# Patient Record
Sex: MALE | Race: WHITE | Employment: FULL TIME | URBAN - METROPOLITAN AREA
[De-identification: names, ages, dates, MRNs, and addresses within clinical notes are randomized per-mention and may not be internally consistent; named-entity substitution may affect disease eponyms.]

---

## 2017-11-21 ENCOUNTER — OFFICE VISIT (OUTPATIENT)
Dept: DERMATOLOGY | Age: 47
End: 2017-11-21

## 2017-11-21 DIAGNOSIS — L91.8 CUTANEOUS SKIN TAGS: ICD-10-CM

## 2017-11-21 DIAGNOSIS — L81.2 FRECKLES: ICD-10-CM

## 2017-11-21 DIAGNOSIS — L98.9 SKIN LESION OF FACE: Primary | ICD-10-CM

## 2017-11-21 PROCEDURE — 99202 OFFICE O/P NEW SF 15 MIN: CPT | Performed by: DERMATOLOGY

## 2017-11-21 PROCEDURE — G8421 BMI NOT CALCULATED: HCPCS | Performed by: DERMATOLOGY

## 2017-11-21 PROCEDURE — G8427 DOCREV CUR MEDS BY ELIG CLIN: HCPCS | Performed by: DERMATOLOGY

## 2017-11-21 PROCEDURE — 1036F TOBACCO NON-USER: CPT | Performed by: DERMATOLOGY

## 2017-11-21 PROCEDURE — G8484 FLU IMMUNIZE NO ADMIN: HCPCS | Performed by: DERMATOLOGY

## 2017-11-21 NOTE — PROGRESS NOTES
central forehead - likely lipoma    Return for excision. Discussed risk of scar. 2. Cutaneous skin tags - not bothersome    Reassurance. 3. Freckles     Encouraged sun protective behaviors. Return for 30 minute procedure.

## 2018-11-30 ENCOUNTER — PROCEDURE VISIT (OUTPATIENT)
Dept: DERMATOLOGY | Age: 48
End: 2018-11-30
Payer: COMMERCIAL

## 2018-11-30 DIAGNOSIS — L72.3 INFLAMED EPIDERMOID CYST OF SKIN: Primary | ICD-10-CM

## 2018-11-30 PROCEDURE — 11441 EXC FACE-MM B9+MARG 0.6-1 CM: CPT | Performed by: DERMATOLOGY

## 2018-11-30 NOTE — PROGRESS NOTES
[facility]  Anastasia Dominique MD  [phone]      [facility]  1970    57 y.o. male     Date of Visit: 11/30/2018    Chief Complaint: skin lesion forehead    History of Present Illness:    Here today for an enlarging intermittently painful lesion on the forehead. Review of Systems:  Gen: Feels well, good sense of health. Heme: No abnormal bruising or bleeding. Past Medical History, Family History, Surgical History, Medications and Allergies reviewed. Past Medical History:   Diagnosis Date    Hyperlipidemia      Past Surgical History:   Procedure Laterality Date    BACK SURGERY  2015    spinal fusion       No Known Allergies  Outpatient Prescriptions Marked as Taking for the 11/30/18 encounter (Procedure visit) with Amanda Pereira MD   Medication Sig Dispense Refill    atorvastatin (LIPITOR) 10 MG tablet Take 10 mg by mouth nightly.  ALPRAZolam (XANAX) 1 MG tablet Take 1 mg by mouth 3 times daily as needed for Sleep.  Choline Fenofibrate (FENOFIBRIC ACID) 135 MG CPDR Take  by mouth.  omega-3 acid ethyl esters (LOVAZA) 1 G capsule Take 2 g by mouth 2 times daily.  vitamin D (CHOLECALCIFEROL) 1000 UNIT TABS tablet Take 3,000 Units by mouth daily       esomeprazole Magnesium (NEXIUM) 40 MG PACK Take 40 mg by mouth daily. Physical Examination       Well appearing. 1.  Central forehead - 1 cm round mildly erythematous nodule. Assessment and Plan     1. Inflamed epidermoid cyst of skin of the forehead    The site to be treated was confirmed with the patient and the previous note. The patient was educated regarding potential risks of bleeding, discomfort, scar, and recurrence. A consent form was signed by the patient. The area was prepped and draped in the normal sterile fashion. Local anesthesia was obtained wth 1% lidocaine with epinephrine.  An incision was performed overlying the cyst, the cyst was visualized

## 2020-09-24 ENCOUNTER — OFFICE VISIT (OUTPATIENT)
Dept: ORTHOPEDIC SURGERY | Age: 50
End: 2020-09-24
Payer: COMMERCIAL

## 2020-09-24 VITALS — TEMPERATURE: 97.4 F | WEIGHT: 244 LBS | BODY MASS INDEX: 32.34 KG/M2 | HEIGHT: 73 IN

## 2020-09-24 PROCEDURE — G8419 CALC BMI OUT NRM PARAM NOF/U: HCPCS | Performed by: ORTHOPAEDIC SURGERY

## 2020-09-24 PROCEDURE — G8427 DOCREV CUR MEDS BY ELIG CLIN: HCPCS | Performed by: ORTHOPAEDIC SURGERY

## 2020-09-24 PROCEDURE — 1036F TOBACCO NON-USER: CPT | Performed by: ORTHOPAEDIC SURGERY

## 2020-09-24 PROCEDURE — 99204 OFFICE O/P NEW MOD 45 MIN: CPT | Performed by: ORTHOPAEDIC SURGERY

## 2020-09-24 RX ORDER — MELOXICAM 15 MG/1
15 TABLET ORAL DAILY
Qty: 90 TABLET | Refills: 3 | Status: SHIPPED | OUTPATIENT
Start: 2020-09-24 | End: 2022-07-08

## 2020-09-24 NOTE — PROGRESS NOTES
9/24/20  History of Present Illness:  Enedina Shetty is a 54 y.o. male    Chief complaint today in the office: First-time evaluation for right knee pain he had seen another orthopedic group about a month ago did not feel comfortable and wanted to come get a second opinion  Location right  knee   Severity 7 out of 10  Duration about 6 weeks  Associated sign/symptoms pain, swelling, stiffness, trouble doing stairs, trouble getting up out of a chair gets much worse towards the end of the day    Medical History  Patient's medications, allergies, past medical, surgical, social and family histories were reviewed and updated as appropriate. I have reviewed and discussed the below Pain assessment findings with the patient. Pain Assessment  Location of Pain: Knee  Location Modifiers: Right, Anterior  Severity of Pain: 7  Quality of Pain: Sharp  Duration of Pain: Persistent  Frequency of Pain: Intermittent  Aggravating Factors: Walking, Stairs, Exercise  Limiting Behavior: Some  Relieving Factors: Rest, Nsaids  Result of Injury: No  Work-Related Injury: No  Are there other pain locations you wish to document?: No    Review of Systems  No new rashes  No numbness  No tingling  No fever  No depression  No new pain patterns  Pertinent items are noted in HPI  Review of systems reviewed from Patient History Form dated on 9/24/2020 and available in the patient's chart under the Media tab. No change in the patient's medical history form. Examination:  General Exam:    Vitals: Height 6' 1\" (1.854 m), weight 244 lb (110.7 kg). BMI Readings from Last 3 Encounters:   09/24/20 32.19 kg/m²     Constitutional: Patient is adequately groomed with no evidence of malnutrition  Mental Status: The patient is alert and oriented to time, place and person. The patient's mood and affect are appropriate. Lymphatic: The lymphatic examination bilaterally reveals all areas to be without enlargement or induration.   Vascular: Examination reveals no swelling or calf tenderness. Peripheral pulses are palpable and 2+. Neurological: The patient has good coordination. There is no weakness or sensory deficit. Skin:    Head/Neck: inspection reveals no rashes, ulcerations or lesions. Trunk:  inspection reveals no rashes, ulcerations or lesions. Right Lower Extremity: inspection reveals no rashes, ulcerations or lesions. Left Lower Extremity: inspection reveals no rashes, ulcerations or lesions. PHYSICAL EXAM:      Knee Examination  Inspection:  No abrasions no lacerations no signs of infection or obvious deformity moderate to severe obvious swelling and joint effusion. Palpation:   Palpation reveals moderate pain medial joint line,   No lateral joint line pain, moderate joint effusion. Range of Motion: 0-150 degrees flexion/ extension   Hip extension to 20 hip flexion to 70+  Lumbar ROM -20 extension flexion to 6 inches from the floor. Strength: Quadriceps testing 5/5, hamstring muscle testing 5/5, EHL against resistance is 5/5, hip flexor strength is intact 5/5, internal and external rotation of the hip against resistance is also intact 5/5. Special Tests: stable Lachman, negative anterior drawer, negative pivot shift, no posterior sag no posterior drawer does not open to valgus or varus stress at 0 or 30°, Steinmann's positive painful medial, Eric's positive painful medial, Homans negative Harry negative, pedal pulses are +2/4 capillary refill is brisk sensation is intact ankle exam and hip exam are shows no pain with full range of motion provocative testing of the hip is nonpainful muscle testing around the hip is 5 over 5. Large joint effusion lumbar flexion to 6 inches from floor without pain. Gait: antalgic     Reflex: Intact  Lower extremity Deep tendon reflexes +2/4 and equal bilaterally for patella and Achilles.   Upper extremity reflexes:  of the biceps, triceps, brachioradialis Placed This Encounter   Procedures    XR KNEE RIGHT (MIN 4 VIEWS)     Standing Status:   Future     Number of Occurrences:   1     Standing Expiration Date:   2021       Differential Diagnoses: Medial meniscus tear with large joint effusion, infection, contusion, knee pathology, Muscle injury, bone tumor or stress fracture. Possible other diagnoses: Same as differential diagnosis      Plan (Medical Decision Making):    I discussed the diagnosis and the treatment options with Singh Bergman today. In Summary:  The various treatment options were outlined and discussed with Singh Bergman including:  Conservative care options: physical therapy, ice, medications, bracing, and activity modification. The indications for therapeutic injections. The indications for additional imaging/laboratory studies. The indications for (possible future) interventions. After considering the various options discussed, Signh Bergman elected to pursue a course of treatment that includes the followin. Medications: No further recommendations for new medications. 2. PT:  Prescribed home exercise program.    3. Further studies: Setup for Knee MRI without contrast to evaluate for soft tissue pathology, loose body, ligament and tendon tears      4. Interventional:  \"After further imaging is obtained, interventional options will be reviewed and recommended. Radiologic imaging and symptoms confirm the pain etiology. Risks, benefits and alternatives of interventional options were discussed. These include and are not limited to bleeding, infection, spinal headache, nerve injury, increased pain and lack of pain relief. The patient verbalized understanding and would like to proceed. The patient will be scheduled accordingly    5.  Healthy Lifestyle Measures:  Patient education material reviewing the following was distributed to Singh Bergman  Anatomic drawings  Healthy lifestyle education  Advanced imaging preparedness

## 2020-10-01 ENCOUNTER — OFFICE VISIT (OUTPATIENT)
Dept: ORTHOPEDIC SURGERY | Age: 50
End: 2020-10-01
Payer: COMMERCIAL

## 2020-10-01 VITALS — HEIGHT: 73 IN | BODY MASS INDEX: 32.34 KG/M2 | WEIGHT: 244 LBS | TEMPERATURE: 97.4 F

## 2020-10-01 PROBLEM — M65.9 SYNOVITIS OF KNEE: Status: ACTIVE | Noted: 2020-10-01

## 2020-10-01 PROBLEM — M25.561 ACUTE PAIN OF RIGHT KNEE: Status: ACTIVE | Noted: 2020-10-01

## 2020-10-01 PROBLEM — M71.21 SYNOVIAL CYST OF RIGHT POPLITEAL SPACE: Status: ACTIVE | Noted: 2020-10-01

## 2020-10-01 PROBLEM — S83.231A COMPLEX TEAR OF MEDIAL MENISCUS OF RIGHT KNEE AS CURRENT INJURY: Status: ACTIVE | Noted: 2020-10-01

## 2020-10-01 PROCEDURE — G8484 FLU IMMUNIZE NO ADMIN: HCPCS | Performed by: ORTHOPAEDIC SURGERY

## 2020-10-01 PROCEDURE — 99214 OFFICE O/P EST MOD 30 MIN: CPT | Performed by: ORTHOPAEDIC SURGERY

## 2020-10-01 PROCEDURE — 1036F TOBACCO NON-USER: CPT | Performed by: ORTHOPAEDIC SURGERY

## 2020-10-01 PROCEDURE — G8427 DOCREV CUR MEDS BY ELIG CLIN: HCPCS | Performed by: ORTHOPAEDIC SURGERY

## 2020-10-01 PROCEDURE — G8417 CALC BMI ABV UP PARAM F/U: HCPCS | Performed by: ORTHOPAEDIC SURGERY

## 2020-10-01 NOTE — PROGRESS NOTES
10/1/20  History of Present Illness:  Dixie Jerry is a 54 y.o. male    Chief complaint today in the office: Right knee recheck evaluation    Location right knee   Severity moderate to severe  Duration several weeks now  Associated sign/symptoms pain, swelling, loss of motion some catching some locking some giving way    Medical History  Patient's medications, allergies, past medical, surgical, social and family histories were reviewed and updated as appropriate. Review of Systems  No new rashes  No numbness  No tingling  No fever  No depression  No new pain patterns  Pertinent items are noted in HPI  Review of systems reviewed from Patient History Form dated on 9/24/2020 and available in the patient's chart under the Media tab. No change in the patient's medical history form. Examination:  General Exam:    Vitals: Temperature 97.4 °F (36.3 °C), height 6' 1\" (1.854 m), weight 244 lb (110.7 kg). BMI Readings from Last 3 Encounters:   10/01/20 32.19 kg/m²   09/24/20 32.19 kg/m²     Constitutional: Patient is adequately groomed with no evidence of malnutrition  Mental Status: The patient is alert and oriented to time, place and person. The patient's mood and affect are appropriate. Lymphatic: The lymphatic examination bilaterally reveals all areas to be without enlargement or induration. Vascular: Examination reveals no swelling or calf tenderness. Peripheral pulses are palpable and 2+. Neurological: The patient has good coordination. There is no weakness or sensory deficit. Skin:    Head/Neck: inspection reveals no rashes, ulcerations or lesions. Trunk:  inspection reveals no rashes, ulcerations or lesions. Right Lower Extremity: inspection reveals no rashes, ulcerations or lesions. Left Lower Extremity: inspection reveals no rashes, ulcerations or lesions.                                       PHYSICAL EXAM:      Knee Examination  Inspection:  No abrasions no lacerations no signs of infection or obvious deformity moderate obvious swelling and joint effusion. Palpation:   Palpation reveals moderate pain medial joint line,   Moderate lateral joint line pain, moderate joint effusion. Range of Motion: 0-140 degrees flexion/ extension   Hip extension to 20 hip flexion to 70+  Lumbar ROM -20 extension flexion to 6 inches from the floor. Strength: Quadriceps testing 4/5, hamstring muscle testing 4/5, EHL against resistance is 5/5, hip flexor strength is intact 5/5, internal and external rotation of the hip against resistance is also intact 5/5. Special Tests: stable Lachman, negative anterior drawer, negative pivot shift, no posterior sag no posterior drawer does not open to valgus or varus stress at 0 or 30°, Steinmann's positive, Eric's positive, Homans negative Harry negative, pedal pulses are +2/4 capillary refill is brisk sensation is intact ankle exam and hip exam are shows no pain with full range of motion provocative testing of the hip is nonpainful muscle testing around the hip is 5 over 5. Moderate sized Baker's cyst posteriorly with pain  Lumbar flexion to 6 inches from floor without pain. Gait: antalgic     Reflex: Intact  Lower extremity Deep tendon reflexes +2/4 and equal bilaterally for patella and Achilles. Upper extremity reflexes:  of the biceps, triceps, brachioradialis +2/4 equal bilaterally. Contralateral  Knee: Negative Lachman negative anterior drawer negative pivot shift no posterior sag no posterior drawer does not open to valgus or varus stress at 0 or 30° negative Steinmann's negative Eric's negative Homans negative Harry pedal pulses are +2/4 capillary refill is brisk sensation is intact ankle exam and hip exam are shows no pain with full range of motion provocative testing of the hip is nonpainful muscle testing around the hip is 5 over 5.       Hip and lumbar testing does not reproduce pain evocative testing does not reproduce symptomatology. Additional Examinations:  Right Upper Extremity:  Examination of the right upper extremity does not show any tenderness, deformity or injury. Range of motion is unremarkable. There is no gross instability. There are no rashes, ulcerations or lesions. Strength and tone are normal.  Left Upper Extremity: Examination of the left upper extremity does not show any tenderness, deformity or injury. Range of motion is unremarkable. There is no gross instability. There are no rashes, ulcerations or lesions. Strength and tone are normal.  Lower Back: Examination of the lower back does not show any tenderness, deformity or injury. Range of motion is unremarkable. There is no gross instability. There are no rashes, ulcerations or lesions. Strength and tone are normal.    Past Surgical History:   Procedure Laterality Date    BACK SURGERY      spinal fusion   . Radiology:     X-rays reviewed in office:  I independently reviewed the films in the office today. Views MRI multiple views  Body Part right knee  Impression medial meniscus tear, synovitis, MCL injury, Baker's cyst with dehiscence, IT band syndrome,    Xr Knee Right (min 4 Views)    Result Date: 2020  Radiology exam is complete. No Radiologist dictation. Please follow up with ordering provider. Mri Lower Extremity W Jt Wo Contrast    Result Date: 2020  Site: [facility] #: [id number] #: [id number] Procedure: MR Right Knee w/o Contrast ; Reason for Exam: DX: Acute pain in Right knee; MMT This document is confidential medical information. Unauthorized disclosure or use of this information is prohibited by law. If you are not the intended recipient of this document, please advise us by calling immediately [phone].  Orlin Whaley, [address] [place] [address] Patient Name: Galen Elizabeth Case ID: [id number] Patient : 1970 Referring Physician: Monae Chang DO Exam Date: 2020 Exam Description: MR Right Knee placed in this encounter. Differential Diagnoses: Medial meniscus tear, MCL injury, Baker's cyst, IT band syndrome, synovitis, infection, contusion, knee pathology, Muscle injury, bone tumor or stress fracture. Possible other diagnoses: Same as differential diagnosis      Plan (Medical Decision Making):    I discussed the diagnosis and the treatment options with Merissaelizabeth Greenwood today. In Summary:  The various treatment options were outlined and discussed with Merissaelizabeth Greenwood including:  Conservative care options: physical therapy, ice, medications, bracing, and activity modification. The indications for therapeutic injections. The indications for additional imaging/laboratory studies. The indications for (possible future) interventions. After considering the various options discussed, Merissa Greenwood elected to pursue a course of treatment that includes the followin. Medications: Injection today so we can go on vacation in [place]    2. PT:  Prescribed home exercise program.    3. Further studies: No further studies. 4. Interventional:  We discussed pursuing I spent 15+ minutes, face to face, with the patient discussing and answering questions regarding the risks, benefits, and complications of arthroscopic knee surgery. The patient realizes that there are concerns with this surgery with respect to infection, deep vein thrombosis, neurological injury, delayed  rehabilitation, the possibility of arthrofibrosis of the knee, and specifically  Hoffa's fat pad fibrosis that can potentially cause difficulties. The patient realizes that there are also anesthetic concerns including cardiopulmonary issues, pulmonary issues, and even possibility of death or dystrophy. The patient voiced understanding to this as well as the normal  rehabilitation  that   is involved with weeks of physical therapy, exercise, and strengthening.  The patient also realizes that even though the surgery, from a functional perspective, typically allows the patient to return to good function at about 6 weeks, that it often takes 6 months to completely rehabilitate from this operation. The patient also realizes that if there is an arthritic component to the symptoms, then they may still have some degree of arthritis pain. .  Radiologic imaging and symptoms confirm the pain etiology. Risks, benefits and alternatives of interventional options were discussed. These include and are not limited to bleeding, infection, spinal headache, nerve injury, increased pain and lack of pain relief. The patient verbalized understanding and would like to proceed. The patient will be scheduled accordingly    5. Healthy Lifestyle Measures:  Patient education material reviewing the following was distributed to Chinedu Sanford  Anatomic drawings  Healthy lifestyle education  Advanced imaging preparedness    Proper lifting and carrying techniques,   Weight management discussed  Quitting smoking      6. Follow up:  after surgery      Chinedu Sanford was instructed to call the office if his symptoms worsen or if new symptoms appear prior to the next scheduled visit. He is specifically instructed to contact the office between now & his scheduled appointment if he has concerns related to his condition or if he needs assistance in scheduling the above tests. He is   welcome to call for an appointment sooner if he has any additional concerns or questions. Albertina Pereira DO    [facility]  Sports Fellowship Trained  Board Certified  [employer] Team Physician      Disclaimer: \"This note was dictated with voice recognition software. Though review and correction are routine, we apologize for any errors. \"

## 2020-10-15 ENCOUNTER — TELEPHONE (OUTPATIENT)
Dept: ORTHOPEDIC SURGERY | Age: 50
End: 2020-10-15

## 2020-10-26 LAB — SARS-COV-2, PCR: NOT DETECTED

## 2021-03-08 ENCOUNTER — TELEPHONE (OUTPATIENT)
Dept: ORTHOPEDIC SURGERY | Age: 51
End: 2021-03-08

## 2021-03-08 NOTE — TELEPHONE ENCOUNTER
E-MAILED [facility] ([address]) FOR 9/24/2020 TO 11/30/2020 TO THE PATIENT TO THE ADDRESS ON RELEASE FORM.

## 2021-05-03 ENCOUNTER — HOSPITAL ENCOUNTER (OUTPATIENT)
Dept: PHYSICAL THERAPY | Age: 51
Setting detail: THERAPIES SERIES
Discharge: HOME | End: 2021-05-03
Payer: COMMERCIAL

## 2021-05-03 PROCEDURE — 97161 PT EVAL LOW COMPLEX 20 MIN: CPT

## 2021-05-03 PROCEDURE — 97530 THERAPEUTIC ACTIVITIES: CPT

## 2021-05-03 PROCEDURE — 97110 THERAPEUTIC EXERCISES: CPT

## 2021-05-03 NOTE — PLAN OF CARE
The [address] [place] [address] and Sports Rehabilitation, Jackelyn Alaniz  [phone]                                                       Physical Therapy Certification    Dear Referring Practitioner: Dr. Denise Yuan,    We had the pleasure of evaluating the following patient for physical therapy services at [address] [place] [address]. A summary of our findings can be found in the initial assessment below. This includes our plan of care. If you have any questions or concerns regarding these findings, please do not hesitate to contact me at the office phone number checked above.   Thank you for the referral.       Physician Signature:_______________________________Date:__________________  By signing above (or electronic signature), therapists plan is approved by physician      Patient: Brandy Lim   : 1970   MRN: [id number]  Referring Physician: Referring Practitioner: Dr. Denise Yuan      Evaluation Date: 5/3/2021      Medical Diagnosis Information:  Diagnosis: M17.11 Right Knee Osteoarthritis   Treatment Diagnosis: M25.561 R Knee Pain; R53.1 Weakness; M62.559 Atrophy of unspecified thigh                                         Insurance information: PT Insurance Information: PT BENEFITS  UHC/ EFFECTIVE 2020/ ACTIVE/ DED 0/ COPAY 20/ PAYS 100%/ 20 VPCY HARD MAX/ 0 AUTH/ ALL CODES BILLABLE/ TELEHEALTH NOT COVERED/ [facility] REF# [id number]/ 2021 PAG     Precautions/ Contra-indications: Seasonal allergies; mild asthma; HLD; OA right knee    C-SSRS Triggered by Intake questionnaire (Past 2 wk assessment):   [x] No, Questionnaire did not trigger screening.   [] Yes, Patient intake triggered further evaluation      [] C-SSRS Screening completed  [] PCP notified via Plan of Care  [] Emergency services notified     Latex Allergy:  [x]NO      []YES  Preferred Language for Healthcare:   [x]English       []other:    SUBJECTIVE: Patient stated complaint: 52year old male presents to PT with right knee pain. Had surgery on 10/27/20 for medial mensicus, bakers cyst and partial MCL sprain as well as dealt with IT band syndrome/capsulitis. He got COVID immediately after surgery so had to do most of his physical therapy at home via HEP. 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 X-rays now showing \"Bone on bone\" arthritis in medial compartment and was told he needed either a partial or a full knee replacement. Pain is located slightly along MJL but more so anteriorly across PF joint. Can build in intensity and become sharp. Also reports painful clicking, popping, and catching at times.      Relevant Medical History: Right Knee Surgery October 2020 by Dr. 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 walk constantly on concrete floor in steel-toe shoes ([employer])    Living Status/Prior Level of Function: Independent with ADLs and IADLs; walking for exercise, used to go to gym; enjoys golfing    OBJECTIVE:      5/3/21  Flexibility L R Comment   Hamstring      Gastroc      ITB      Quad              5/3/21  ROM PROM AROM Overpressure Comment    L R L R L R    Flexion  130 130 95      Extension   +5 0                            5/3/21  Strength L R Comment   Quad 5 3+ QS   Hamstring 4+ 4    Gastroc      Hip  flexion      Hip abd 4+ 4-                  5/3/21  Special Test Results/Comment   Meniscal Click Neg   Crepitus Pos on both PF joints   Flexion Test Mild MJL pain at end range   Valgus Laxity    Varus Laxity    Lachmans    Drop Back    Homans Neg         5/3/21  Girth L R   Mid Patella 43.5 43.5   Suprapatellar 45.5 46.0   5cm above 49.0 48.5   15cm above       Reflexes/Sensation:    [x]Dermatomes/Myotomes intact    [x]Reflexes equal and normal bilaterally   []Other:    Joint mobility:     []Normal    [x]Hypo: decrease PF mobility mild on right   []Hyper    Palpation: TTP along MJL, pes anserine, and lateral infrapatellar fat pads    Functional Mobility/Transfers: ind    Posture: varus    Bandages/Dressings/Incisions: healed scope incisions    Gait: (include devices/WB status) antalgic on right    Orthopedic Special Tests: see above. [x] Patient history, allergies, meds reviewed. Medical chart reviewed. See intake form. Review Of Systems (ROS):  [x]Performed Review of systems (Integumentary, CardioPulmonary, Neurological) by intake and observation. Intake form has been scanned into medical record. Patient has been instructed to contact their primary care physician regarding ROS issues if not already being addressed at this time.       Co-morbidities/Complexities (which will affect course of rehabilitation):   []None           Arthritic conditions   []Rheumatoid arthritis (M05.9)  [x]Osteoarthritis (M19.91)   Cardiovascular conditions   []Hypertension (I10)  []Hyperlipidemia (E78.5)  []Angina pectoris (I20)  []Atherosclerosis (I70)   Musculoskeletal conditions   []Disc pathology   []Congenital spine pathologies   [x]Prior surgical intervention  []Osteoporosis (M81.8)  []Osteopenia (M85.8)   Endocrine conditions   []Hypothyroid (E03.9)  []Hyperthyroid Gastrointestinal conditions   []Constipation (U92.59)   Metabolic conditions   [x]Morbid obesity (E66.01)  []Diabetes type 1(E10.65) or 2 (E11.65)   []Neuropathy (G60.9)     Pulmonary conditions   []Asthma (J45)  []Coughing   []COPD (J44.9)   Psychological jump   []other:     Participation Restrictions   [x]Reduced participation in self care activities   [x]Reduced participation in home management activities   [x]Reduced participation in work activities   [x]Reduced participation in social activities. [x]Reduced participation in sport activities. Classification :    [x]Signs/symptoms consistent with post-surgical status including decreased ROM, strength and function.    []Signs/symptoms consistent with joint sprain/strain   []Signs/symptoms consistent with patella-femoral syndrome   [x]Signs/symptoms consistent with knee OA/hip OA   [x]Signs/symptoms consistent with internal derangement of knee/Hip   [x]Signs/symptoms consistent with functional hip weakness/NMR control      []Signs/symptoms consistent with tendinitis/tendinosis    []signs/symptoms consistent with pathology which may benefit from Dry needling      []other:      Prognosis/Rehab Potential:      []Excellent   []Good    [x]Fair   []Poor    Tolerance of evaluation/treatment:    []Excellent   []Good    [x]Fair   []Poor    Physical Therapy Evaluation Complexity Justification  [x] A history of present problem with:  [] no personal factors and/or comorbidities that impact the plan of care;  [x]1-2 personal factors and/or comorbidities that impact the plan of care  []3 personal factors and/or comorbidities that impact the plan of care  [x] An examination of body systems using standardized tests and measures addressing any of the following: body structures and functions (impairments), activity limitations, and/or participation restrictions;:  [] a total of 1-2 or more elements   [] a total of 3 or more elements   [x] a total of 4 or more elements   [x] A clinical presentation with:  [x] stable and/or uncomplicated characteristics   [] evolving clinical presentation with changing characteristics  [] unstable and unpredictable characteristics;   [x] Clinical decision making of [x] low, [] moderate, [] high complexity using standardized patient assessment instrument and/or measurable assessment of functional outcome. [x] EVAL (LOW) 58769 (typically 20 minutes face-to-face)  [] EVAL (MOD) 14561 (typically 30 minutes face-to-face)  [] EVAL (HIGH) 64091 (typically 45 minutes face-to-face)  [] RE-EVAL       PLAN  Frequency/Duration:  2 days per week for 12 Weeks:  Interventions:  [x]  Therapeutic exercise including: strength training, ROM, for Lower extremity and core   [x]  NMR activation and proprioception for LE, Glutes and Core   [x]  Manual therapy as indicated for LE, Hip and spine to include: Dry Needling/IASTM, STM, PROM, Gr I-IV mobilizations, manipulation. [x] Modalities as needed that may include: thermal agents, E-stim, Biofeedback, US, iontophoresis as indicated  [x] Patient education on joint protection, postural re-education, activity modification, progression of HEP. HEP instruction:   Patient instructed on HEP on this date with handout provided and all questions answered. Discussions about how to progress sets/reps/resistance as necessary for fatigue and challenge. Patient was instructed to contact PT with any questions or concerns about HEP moving forward. Patient verbally stated she/he understood. Access Code: [email or web address]  URL: [email or web address]  Date: 05/03/2021  Prepared by:  Oli Kaiser    Exercises  Seated Table Hamstring Stretch - 2 x daily - 7 x weekly - 5 reps - 30\" hold  Long Sitting Calf Stretch with Strap - 2 x daily - 7 x weekly - 5 reps - 30\" hold  Long Sitting Quad Set - 1 x daily - 7 x weekly - 10 sets - 10\" hold  Supine Hip Adduction Isometric with Ball - 1 x daily - 7 x weekly - 10 reps - 10\" hold  Supine Active Straight Leg Raise - 1 x daily - 7 x weekly - 2 sets - 5 reps  Hooklying Clamshell with Resistance - 1 x daily - 7 x weekly - 2 sets - 10 reps  Clamshell - 1 x daily - 7 x weekly - 2 sets - 10 reps      GOALS:   Patient stated goal: Reduce pain in knee    [] Progressing: [] Met: [] Not Met: [] Adjusted    Therapist goals for Patient:   Short Term Goals: To be achieved in: 2 weeks  1. Independent in HEP and progression per patient tolerance, in order to prevent re-injury. [] Progressing: [] Met: [] Not Met: [] Adjusted   2. Patient will have a decrease in pain to facilitate improvement in movement, function, and ADLs as indicated by Functional Deficits. [] Progressing: [] Met: [] Not Met: [] Adjusted    Long Term Goals: To be achieved in: 12 weeks  1. Disability index score of 38% or less for the LEFS to assist with reaching prior level of function. [] Progressing: [] Met: [] Not Met: [] Adjusted  2. Patient will demonstrate increased AROM to 0-125 without pain to allow for proper joint functioning as indicated by patients Functional Deficits. [] Progressing: [] Met: [] Not Met: [] Adjusted  3. Patient will demonstrate an increase in Strength to g5/5 in BLE  to allow for proper functional mobility as indicated by patients Functional Deficits. [] Progressing: [] Met: [] Not Met: [] Adjusted  4. Patient will return to ADL and other functional activities without increased symptoms or restriction. [] Progressing: [] Met: [] Not Met: [] Adjusted  5. Patient will tolerate walking >30 minutes with <2/10 pain in knee (patient specific functional goal)    [] Progressing: [] Met: [] Not Met: [] Adjusted       Electronically signed by:  Ariane Antoine, PT, DPT, OCS    Note: If patient does not return for scheduled/ recommended follow up visits, this note will serve as a discharge from care along with most recent update on progress.

## 2021-05-03 NOTE — FLOWSHEET NOTE
[facility] [address] [place] [address] [facility]    [facility] Therapy Daily Treatment Note  Date:  5/3/2021    Patient Name:  Clair Leonardo    :  1970  MRN: [id number]  Restrictions/Precautions:    Medical/Treatment Diagnosis Information:  · Diagnosis: M17.11 Right Knee Osteoarthritis  · Treatment Diagnosis: M25.561 R Knee Pain; R53.1 Weakness; M62.559 Atrophy of unspecified thigh  Insurance/Certification information:  PT Insurance Information: PT BENEFITS  UHC/ EFFECTIVE 2020/ ACTIVE/ DED 0/ COPAY 20/ PAYS 100%/ 20 VPCY HARD MAX/ 0 AUTH/ ALL CODES BILLABLE/ TELEHEALTH NOT COVERED/ [facility] REF# [id number]/ 2021 PAG  Physician Information:  Referring Practitioner: Dr. Christianne Jones  Has the plan of care been signed (Y/N):        []  Yes  [x]  No     Date of Patient follow up with Physician: not scheduled      Is this a Progress Report:     []  Yes  [x]  No        If Yes:  Date Range for reporting period:  Beginnin/3/21  Ending:    Progress report will be due (10 Rx or 30 days whichever is less): 58       Recertification will be due (POC Duration  / 90 days whichever is less): 8/3/21         Visit # Insurance Allowable Auth Required   1 20 VPCY []  Yes []  No        OUTCOME MEASURE DATE DEFICIT   LEFS 5/3/21 IE 77% Deficit                 Latex Allergy:  [x]NO      []YES  Preferred Language for Healthcare:   [x]English       []other:    Pain level:  1-8/10     SUBJECTIVE:  See eval    OBJECTIVE:       5/3/21  Flexibility L R Comment   Hamstring -45 -45 90/90   Gastroc  Mod Mod     ITB Mod Mod Obers   Quad mild mild Elys                5/3/21  ROM PROM AROM Overpressure Comment     L R L R L R     Flexion   130 130 95         Extension     +5 0                                                5/3/21  Strength L R Comment   Quad 5 3+ QS   Hamstring 4+ 4     Gastroc         Hip  flexion         Hip abd 4+ 4-                            5/3/21  Special Test Results/Comment   Meniscal Click Neg   Crepitus Pos on both PF joints   Flexion Test Mild MJL pain at end range   Valgus Laxity     Varus Laxity     Lachmans     Drop Back     Homans Neg            5/3/21  Girth L R   Mid Patella 43.5 43.5   Suprapatellar 45.5 46.0   5cm above 49.0 48.5   15cm above          Reflexes/Sensation:               [x]? Dermatomes/Myotomes intact               [x]? Reflexes equal and normal bilaterally              []?Other:     Joint mobility:                []?Normal                       [x]? Hypo: decrease PF mobility mild on right              []?Hyper     Palpation: TTP along MJL, pes anserine, and lateral infrapatellar fat pads     Functional Mobility/Transfers: ind     Posture: varus     Bandages/Dressings/Incisions: healed scope incisions     Gait: (include devices/WB status) antalgic on right     Orthopedic Special Tests: see above.       RESTRICTIONS/PRECAUTIONS: Right Knee Surgery October 2020 by Dr. Manda Jj;   Exercises/Interventions:  Exercise/Equipment Resistance/Repetitions Other comments   Stretching       Hamstring 5x30\" Prop   Hip Flexor     ITB     Figure 4     Quad     Inclined Calf     Towel Pull 5x30\" Prop           ROM       EOB Self-Assist     Sheet Pull     Wall Slide     Manual     Biodex Passive     Recumbent Bike     ERMI     Hang Weights     Ankle Pumps             Patellar Glides       Medial       Superior       Inferior               Isometrics       Quad sets 10x10\" Towel roll under right knee   Add sets 10x10\"             SLR       Supine 2x5    Prone     Abduction     Adducton     SLR+               Glutes       Bridges     Supine Clams 3x10; Blue    S/L Clams 2x10 each No res   Side Stepping       Monster walks               CKC       Weight Shift     Calf raises     Wall sits     Step ups       Squatting     CC TKE     SL DL               PRE       Extension  RANGE: 90-30   Flexion  RANGE: Avail   Leg Press   RANGE: 80-10   Cable Column               Balance about HEP moving forward. Patient verbally stated she/he understood. Access Code: [email or web address]  URL: [email or web address]  Date: 05/03/2021  Prepared by: Oli Kaiser    Exercises  Seated Table Hamstring Stretch - 2 x daily - 7 x weekly - 5 reps - 30\" hold  Long Sitting Calf Stretch with Strap - 2 x daily - 7 x weekly - 5 reps - 30\" hold  Long Sitting Quad Set - 1 x daily - 7 x weekly - 10 sets - 10\" hold  Supine Hip Adduction Isometric with Ball - 1 x daily - 7 x weekly - 10 reps - 10\" hold  Supine Active Straight Leg Raise - 1 x daily - 7 x weekly - 2 sets - 5 reps  Hooklying Clamshell with Resistance - 1 x daily - 7 x weekly - 3 sets - 10 reps  Clamshell - 1 x daily - 7 x weekly - 2 sets - 10 reps      Therapeutic Exercise and NMR EXR  [x] (27792) Provided verbal/tactile cueing for activities related to strengthening, flexibility, endurance, ROM for improvements in LE, proximal hip, and core control with self care, mobility, lifting, ambulation. [x] (41671) Provided verbal/tactile cueing for activities related to improving balance, coordination, kinesthetic sense, posture, motor skill, proprioception  to assist with LE, proximal hip, and core control in self care, mobility, lifting, ambulation and eccentric single leg control.      NMR and Therapeutic Activities:    [x] (29598 or 76083) Provided verbal/tactile cueing for activities related to improving balance, coordination, kinesthetic sense, posture, motor skill, proprioception and motor activation to allow for proper function of core, proximal hip and LE with self care and ADLs  [] (41475) Gait Re-education- Provided training and instruction to the patient for proper LE, core and proximal hip recruitment and positioning and eccentric body weight control with ambulation re-education including up and down stairs     Home Exercise Program:    [x] (38286) Reviewed/Progressed HEP activities related to strengthening, flexibility, endurance, ROM of core, level of function. [] Progressing: [] Met: [] Not Met: [] Adjusted  2. Patient will demonstrate increased AROM to 0-125 without pain to allow for proper joint functioning as indicated by patients Functional Deficits. [] Progressing: [] Met: [] Not Met: [] Adjusted  3. Patient will demonstrate an increase in Strength to g5/5 in BLE  to allow for proper functional mobility as indicated by patients Functional Deficits. [] Progressing: [] Met: [] Not Met: [] Adjusted  4. Patient will return to ADL and other functional activities without increased symptoms or restriction. [] Progressing: [] Met: [] Not Met: [] Adjusted  5. Patient will tolerate walking >30 minutes with <2/10 pain in knee (patient specific functional goal)    [] Progressing: [] Met: [] Not Met: [] Adjusted    Overall Progression Towards Functional goals/ Treatment Progress Update:  [] Patient is progressing as expected towards functional goals listed. [] Progression is slowed due to complexities/Impairments listed. [] Progression has been slowed due to co-morbidities.   [x] Plan just implemented, too soon to assess goals progression <30days   [] Goals require adjustment due to lack of progress  [] Patient is not progressing as expected and requires additional follow up with physician  [] Other    Prognosis for POC: [x] Good [] Fair  [] Poor    Patient requires continued skilled intervention: [x] Yes  [] No    Treatment/Activity Tolerance:  [x] Patient able to complete treatment  [] Patient limited by fatigue  [] Patient limited by pain     [] Patient limited by other medical complications  [] Other:       PLAN: See eval  [] Continue per plan of care [] Alter current plan (see comments above)  [x] Plan of care initiated [] Hold pending MD visit [] Discharge    Electronically signed by:  Ronnie Dang, PT, DPT, OCS    Note: If patient does not return for scheduled/ recommended follow up visits, this note will serve as a discharge from care along with most recent update on progress.

## 2021-05-10 ENCOUNTER — HOSPITAL ENCOUNTER (OUTPATIENT)
Dept: PHYSICAL THERAPY | Age: 51
Setting detail: THERAPIES SERIES
Discharge: HOME | End: 2021-05-10
Payer: COMMERCIAL

## 2021-05-10 NOTE — FLOWSHEET NOTE
The [address]    Physical Therapy  Cancellation/No-show Note  Patient Name:  Carmine Gonzalez  :  1970   Date:  5/10/2021  Cancelled visits to date: 1  No-shows to date: 0    For today's appointment patient:  [x]  Cancelled  [x]  Rescheduled appointment  []  No-show     Reason given by patient:  []  Patient ill  []  Conflicting appointment   []  No transportation    [x]  Conflict with work  []  No reason given  []  Other:     Comments:      Electronically signed by:  Romeo Calvillo, PT, DPT, OCS

## 2021-05-11 ENCOUNTER — HOSPITAL ENCOUNTER (OUTPATIENT)
Dept: PHYSICAL THERAPY | Age: 51
Setting detail: THERAPIES SERIES
Discharge: HOME | End: 2021-05-11
Payer: COMMERCIAL

## 2021-05-11 PROCEDURE — 97110 THERAPEUTIC EXERCISES: CPT

## 2021-05-11 PROCEDURE — 97112 NEUROMUSCULAR REEDUCATION: CPT

## 2021-05-11 NOTE — FLOWSHEET NOTE
[address] [facility], [facility]    Physical Therapy Daily Treatment Note  Date:  2021    Patient Name:  Carlyle Rowley    :  1970  MRN: [id number]  Restrictions/Precautions:    Medical/Treatment Diagnosis Information:  · Diagnosis: M17.11 Right Knee Osteoarthritis  · Treatment Diagnosis: M25.561 R Knee Pain; R53.1 Weakness; M62.559 Atrophy of unspecified thigh  Insurance/Certification information:  PT Insurance Information: PT BENEFITS  UHC/ EFFECTIVE 2020/ ACTIVE/ DED 0/ COPAY 20/ PAYS 100%/ 20 VPCY HARD MAX/ 0 AUTH/ ALL CODES BILLABLE/ TELEHEALTH NOT COVERED/ [facility] REF# [id number] 2021 PAG  Physician Information:  Referring Practitioner: Dr. Jonathan Elizabeth  Has the plan of care been signed (Y/N):        []  Yes  [x]  No     Date of Patient follow up with Physician: not scheduled      Is this a Progress Report:     []  Yes  [x]  No        If Yes:  Date Range for reporting period:  Beginnin/3/21  Ending:    Progress report will be due (10 Rx or 30 days whichever is less): 18       Recertification will be due (POC Duration  / 90 days whichever is less): 8/3/21         Visit # Insurance Allowable Auth Required   2 20 VPCY []  Yes []  No        OUTCOME MEASURE DATE DEFICIT   LEFS 5/3/21 IE 77% Deficit                 Latex Allergy:  [x]NO      []YES  Preferred Language for Healthcare:   [x]English       []other:    Pain level:  1-8/10     SUBJECTIVE: no new issues. Stiffness still present in knee. Bought inserts for steel toe boots at work and this has not helped much to date. Intermittent compliance with HEP.     OBJECTIVE:       5/3/21  Flexibility L R Comment   Hamstring -45 -45 90/90   Gastroc  Mod Mod     ITB Mod Mod Obers   Quad mild mild Elys                5/3/21  ROM PROM AROM Overpressure Comment     L R L R L R     Flexion   130 130 95         Extension     +5 0                                                5/3/21  Strength L R Comment   Quad 5 3+ QS   Hamstring 4+ 4     Gastroc         Hip  flexion         Hip abd 4+ 4-                            5/3/21  Special Test Results/Comment   Meniscal Click Neg   Crepitus Pos on both PF joints   Flexion Test Mild MJL pain at end range   Valgus Laxity     Varus Laxity     Lachmans     Drop Back     Homans Neg            5/3/21  Girth L R   Mid Patella 43.5 43.5   Suprapatellar 45.5 46.0   5cm above 49.0 48.5   15cm above          Reflexes/Sensation:               [x]? Dermatomes/Myotomes intact               [x]? Reflexes equal and normal bilaterally              []?Other:     Joint mobility:                []?Normal                       [x]? Hypo: decrease PF mobility mild on right              []?Hyper     Palpation: TTP along MJL, pes anserine, and lateral infrapatellar fat pads     Functional Mobility/Transfers: ind     Posture: varus     Bandages/Dressings/Incisions: healed scope incisions     Gait: (include devices/WB status) antalgic on right     Orthopedic Special Tests: see above.       RESTRICTIONS/PRECAUTIONS: Right Knee Surgery October 2020 by Dr. Jadyn Francis;   Exercises/Interventions:  Exercise/Equipment Resistance/Repetitions Other comments   Stretching       Hamstring 5x30\" Prop   Hip Flexor     ITB     Figure 4     Quad     Inclined Calf     Towel Pull 5x30\" Prop           ROM       EOB Self-Assist     Sheet Pull     Wall Slide     Manual     Biodex Passive     Recumbent Bike 5' Level 3; seat 14   ERMI     Hang Weights     Ankle Pumps             Patellar Glides       Medial       Superior       Inferior               Isometrics       HEPHEP        SLR       HEP   Prone     Abduction     Adducton     SLR+               Glutes       Bridges     HEPHEPSide Stepping       Monster walks               CKC       Weight Shift     Calf raises     Wall sits     Step ups       Squatting     CC TKE     SL DL               PRE       Extension BFR - see below RANGE: 90-30   Flexion BFR - see below RANGE: Avail   Leg Press BFR - see below RANGE: 80-10   Cable Column               Balance       Tandem Stance     Tilt board       SLS      Biodex balance               Other       Treadmill       Gait Training          Manual Interventions          Patient have access to gym? [] Yes  [] No  Patient have equipment at home? [] Yes  [] No     Blood Flow Restriction Training  Smart Cuff Size: 4  LOP: 275mmHg  PTP (60-80% of LOP): 220mmHg  Exercise 10 rep Max Repetition Weight Repetitions   Leg Press  40# 30, 15, 15, 15   Hamstring Curls  0# (try 1-2# NPV) 30, 15, 15, 15   Leg Extension  0# (try 1-2# at NPV) 30, 15, 15, 15      8'   BFR protocol with Reps of 30/15/15/15 with 30-60 seconds rest in between sets and cuff depressed between exercises. Cuff pressure set at 60-80% of LOP measured with doppler ultrasound at posterior tibial pulse and/or dorsalis pedis pulse. Patient was fully educated on Blood Flow Restriction (BFR) protocol this visit; this included how to properly don/doff Smart Cuff as well as how to properly inflate Smart Cuff. They were educated about what symptoms to monitor for while performing BFR and were instructed to immediately stop BFR and release pressure if they experience any numbness/tingling in extremity, intense pain beneath cuff, loss of sensation in extremity or feeling of coldness in extremity. The patient has been medically cleared by MD for initiation of BFR therapy and verbal consent was obtained from patient prior to initiation of BFR therapy. Patient Education:   5/3/21: Patient educated about PT diagnosis, prognosis, and plan of care; educated on role of physical therapy; educated on HEP; educated on anatomy of knee joint; discussed role of quad strength with OA and knee pain; discussed BFR therapy in-depth and it's role in PT/strength building    HEP:  Patient instructed on HEP on this date with handout provided and all questions answered.  Discussions about how to progress sets/reps/resistance as necessary for fatigue and challenge. Patient was instructed to contact PT with any questions or concerns about HEP moving forward. Patient verbally stated she/he understood. Access Code: [email or web address]  URL: [email or web address]  Date: 05/03/2021  Prepared by: Oli Kaiser    Exercises  Seated Table Hamstring Stretch - 2 x daily - 7 x weekly - 5 reps - 30\" hold  Long Sitting Calf Stretch with Strap - 2 x daily - 7 x weekly - 5 reps - 30\" hold  Long Sitting Quad Set - 1 x daily - 7 x weekly - 10 sets - 10\" hold  Supine Hip Adduction Isometric with Ball - 1 x daily - 7 x weekly - 10 reps - 10\" hold  Supine Active Straight Leg Raise - 1 x daily - 7 x weekly - 2 sets - 5 reps  Hooklying Clamshell with Resistance - 1 x daily - 7 x weekly - 3 sets - 10 reps  Clamshell - 1 x daily - 7 x weekly - 2 sets - 10 reps      Therapeutic Exercise and NMR EXR  [x] (35271) Provided verbal/tactile cueing for activities related to strengthening, flexibility, endurance, ROM for improvements in LE, proximal hip, and core control with self care, mobility, lifting, ambulation. [x] (11993) Provided verbal/tactile cueing for activities related to improving balance, coordination, kinesthetic sense, posture, motor skill, proprioception  to assist with LE, proximal hip, and core control in self care, mobility, lifting, ambulation and eccentric single leg control.      NMR and Therapeutic Activities:    [x] (67487 or 05718) Provided verbal/tactile cueing for activities related to improving balance, coordination, kinesthetic sense, posture, motor skill, proprioception and motor activation to allow for proper function of core, proximal hip and LE with self care and ADLs  [] (47620) Gait Re-education- Provided training and instruction to the patient for proper LE, core and proximal hip recruitment and positioning and eccentric body weight control with ambulation re-education including up and down stairs     Home Exercise Program:    [x] (12077) Reviewed/Progressed HEP activities related to strengthening, flexibility, endurance, ROM of core, proximal hip and LE for functional self-care, mobility, lifting and ambulation/stair navigation   [] (70237)Reviewed/Progressed HEP activities related to improving balance, coordination, kinesthetic sense, posture, motor skill, proprioception of core, proximal hip and LE for self care, mobility, lifting, and ambulation/stair navigation      Manual Treatments:  PROM / STM / Oscillations-Mobs:  G-I, II, III, IV (PA's, Inf., Post.)  [x] (15334) Provided manual therapy to mobilize LE, proximal hip and/or LS spine soft tissue/joints for the purpose of modulating pain, promoting relaxation,  increasing ROM, reducing/eliminating soft tissue swelling/inflammation/restriction, improving soft tissue extensibility and allowing for proper ROM for normal function with self care, mobility, lifting and ambulation. Modalities:  Declined    Charges:  Timed Code Treatment Minutes: 40   Total Treatment Minutes: 40   [id number]    [] EVAL (LOW) 64008 (typically 20 minutes face-to-face)  [] EVAL (MOD) 80927 (typically 30 minutes face-to-face)  [] EVAL (HIGH) 64017 (typically 45 minutes face-to-face)  [] RE-EVAL   [x] MYAH(14850) x 2    [] IONTO  [x] NMR (05197) x 1    [] VASO  [] Manual (36967) x      [] Other:  [] TA x 1     [] Mech Traction (71640)  [] ES(attended) (77259)      [] ES (un) (14696):     GOALS:   Patient stated goal: Reduce pain in knee    [] Progressing: [] Met: [] Not Met: [] Adjusted    Therapist goals for Patient:   Short Term Goals: To be achieved in: 2 weeks  1. Independent in HEP and progression per patient tolerance, in order to prevent re-injury. [] Progressing: [] Met: [] Not Met: [] Adjusted   2. Patient will have a decrease in pain to facilitate improvement in movement, function, and ADLs as indicated by Functional Deficits.     [] Progressing: [] Met: [] Not Met: [] Adjusted    Long Term Goals: To be achieved in: 12 weeks  1. Disability index score of 38% or less for the LEFS to assist with reaching prior level of function. [] Progressing: [] Met: [] Not Met: [] Adjusted  2. Patient will demonstrate increased AROM to 0-125 without pain to allow for proper joint functioning as indicated by patients Functional Deficits. [] Progressing: [] Met: [] Not Met: [] Adjusted  3. Patient will demonstrate an increase in Strength to g5/5 in BLE  to allow for proper functional mobility as indicated by patients Functional Deficits. [] Progressing: [] Met: [] Not Met: [] Adjusted  4. Patient will return to ADL and other functional activities without increased symptoms or restriction. [] Progressing: [] Met: [] Not Met: [] Adjusted  5. Patient will tolerate walking >30 minutes with <2/10 pain in knee (patient specific functional goal)    [] Progressing: [] Met: [] Not Met: [] Adjusted    Overall Progression Towards Functional goals/ Treatment Progress Update:  [] Patient is progressing as expected towards functional goals listed. [] Progression is slowed due to complexities/Impairments listed. [] Progression has been slowed due to co-morbidities. [x] Plan just implemented, too soon to assess goals progression <30days   [] Goals require adjustment due to lack of progress  [] Patient is not progressing as expected and requires additional follow up with physician  [] Other    Prognosis for POC: [x] Good [] Fair  [] Poor    Patient requires continued skilled intervention: [x] Yes  [] No    Treatment/Activity Tolerance:  [x] Patient able to complete treatment  [x] Patient limited by fatigue  [] Patient limited by pain     [] Patient limited by other medical complications  [x] Other: Very challenged by BFR program today most notably during SL Leg press. No limitations with knee pain or symptoms. (+) skin color changes showing adequate occlusion present.  Reviewed importance of HEP

## 2021-05-12 ENCOUNTER — APPOINTMENT (OUTPATIENT)
Dept: PHYSICAL THERAPY | Age: 51
End: 2021-05-12
Payer: COMMERCIAL

## 2021-05-13 ENCOUNTER — HOSPITAL ENCOUNTER (OUTPATIENT)
Dept: PHYSICAL THERAPY | Age: 51
Setting detail: THERAPIES SERIES
Discharge: HOME | End: 2021-05-13
Payer: COMMERCIAL

## 2021-05-13 PROCEDURE — 97110 THERAPEUTIC EXERCISES: CPT

## 2021-05-13 PROCEDURE — 97112 NEUROMUSCULAR REEDUCATION: CPT

## 2021-05-13 NOTE — FLOWSHEET NOTE
[facility] [address] [place] [address] [facility]    [facility] Daily Treatment Note  Date:  2021    Patient Name:  Clair Leonardo    :  1970  MRN: [id number]  Restrictions/Precautions:    Medical/Treatment Diagnosis Information:  · Diagnosis: M17.11 Right Knee Osteoarthritis  · Treatment Diagnosis: M25.561 R Knee Pain; R53.1 Weakness; M62.559 Atrophy of unspecified thigh  Insurance/Certification information:  PT Insurance Information: PT BENEFITS  UHC/ EFFECTIVE 2020/ ACTIVE/ DED 0/ COPAY 20/ PAYS 100%/ 20 VPCY HARD MAX/ 0 AUTH/ ALL CODES BILLABLE/ TELEHEALTH NOT COVERED/ [facility] REF# [id number]/ 2021 PAG  Physician Information:  Referring Practitioner: Dr. Elsie Edouard  Has the plan of care been signed (Y/N):        []  Yes  [x]  No     Date of Patient follow up with Physician: not scheduled      Is this a Progress Report:     []  Yes  [x]  No        If Yes:  Date Range for reporting period:  Beginnin/3/21  Ending:    Progress report will be due (10 Rx or 30 days whichever is less): 02       Recertification will be due (POC Duration  / 90 days whichever is less): 8/3/21         Visit # Insurance Allowable Auth Required   3 20 VPCY []  Yes []  No        OUTCOME MEASURE DATE DEFICIT   LEFS 5/3/21 IE 77% Deficit                 Latex Allergy:  [x]NO      []YES  Preferred Language for Healthcare:   [x]English       []other:    Pain level:  6/10 constant,  especially with functional transfers    SUBJECTIVE: Patient reports feeling sore after last session, but subsided that day. No new issues.  Stiffness still present in knee, feels like \"bone on bone\" all the time    OBJECTIVE:       5/3/21  Flexibility L R Comment   Hamstring -45 -45 90/90   Gastroc  Mod Mod     ITB Mod Mod Obers   Quad mild mild Elys                5/3/21  ROM PROM AROM Overpressure Comment     L R L R L R     Flexion   130 130 95         Extension     +5 0                                                5/3/21  Strength L R Comment   Quad 5 3+ QS   Hamstring 4+ 4     Gastroc         Hip  flexion         Hip abd 4+ 4-                            5/3/21  Special Test Results/Comment   Meniscal Click Neg   Crepitus Pos on both PF joints   Flexion Test Mild MJL pain at end range   Valgus Laxity     Varus Laxity     Lachmans     Drop Back     Homans Neg            5/3/21  Girth L R   Mid Patella 43.5 43.5   Suprapatellar 45.5 46.0   5cm above 49.0 48.5   15cm above          Reflexes/Sensation:               [x]? Dermatomes/Myotomes intact               [x]? Reflexes equal and normal bilaterally              []?Other:     Joint mobility:                []?Normal                       [x]? Hypo: decrease PF mobility mild on right              []?Hyper     Palpation: TTP along MJL, pes anserine, and lateral infrapatellar fat pads     Functional Mobility/Transfers: ind     Posture: varus     Bandages/Dressings/Incisions: healed scope incisions     Gait: (include devices/WB status) antalgic on right     Orthopedic Special Tests: see above.       RESTRICTIONS/PRECAUTIONS: Right Knee Surgery October 2020 by Dr. Austin Glaser;   Exercises/Interventions:  Exercise/Equipment Resistance/Repetitions Other comments   Stretching       Hamstring 5x30\" Prop   Hip Flexor     ITB     Figure 4     Quad     Inclined Calf     Towel Pull 5x30\" Prop           ROM       EOB Self-Assist     Sheet Pull     Wall Slide     Manual     Biodex Passive     Recumbent Bike 5' Level 3; seat 14   ERMI     Hang Weights     Ankle Pumps             Patellar Glides       Medial       Superior       Inferior               Isometrics       HEPHEP        SLR       HEP   Prone     Abduction     Adducton     SLR+               Glutes       Bridges     HEPHEPSide Stepping       Monster walks               CKC       Weight Shift     Calf raises Try NPV    Wall sits BFR - see below    Step ups  Try NPV     Squatting     CC TKE     SL DL               PRE Extension BFR - see below RANGE: 90-30   Flexion BFR - see below RANGE: Avail   Leg Press BFR - see below RANGE: 80-10   Cable Column               Balance       Tandem Stance     Tilt board       SLS      Biodex balance               Other       Treadmill       Gait Training          Manual Interventions          Patient have access to gym? [] Yes  [] No  Patient have equipment at home? [] Yes  [] No     Blood Flow Restriction Training  Smart Cuff Size: 4  LOP: 240mmHg  PTP (60-80% of LOP): 192mmHg  Exercise 10 rep Max Repetition Weight Repetitions   Leg Press  40# (inc NPV?) 30, 15, 15, 15   Hamstring Curls  0# prone 30, 15, 15, 15   Leg Extension  2# (inc NPV) 30, 15, 15, 15   Wall Sit  0# 5 x 30\"         BFR protocol with Reps of 30/15/15/15 with 30-60 seconds rest in between sets and cuff depressed between exercises. Cuff pressure set at 60-80% of LOP measured with doppler ultrasound at posterior tibial pulse and/or dorsalis pedis pulse. Patient was fully educated on Blood Flow Restriction (BFR) protocol this visit; this included how to properly don/doff Smart Cuff as well as how to properly inflate Smart Cuff. They were educated about what symptoms to monitor for while performing BFR and were instructed to immediately stop BFR and release pressure if they experience any numbness/tingling in extremity, intense pain beneath cuff, loss of sensation in extremity or feeling of coldness in extremity. The patient has been medically cleared by MD for initiation of BFR therapy and verbal consent was obtained from patient prior to initiation of BFR therapy.       Patient Education:   5/3/21: Patient educated about PT diagnosis, prognosis, and plan of care; educated on role of physical therapy; educated on HEP; educated on anatomy of knee joint; discussed role of quad strength with OA and knee pain; discussed BFR therapy in-depth and it's role in PT/strength building    HEP:  Patient instructed on HEP on this date with handout provided and all questions answered. Discussions about how to progress sets/reps/resistance as necessary for fatigue and challenge. Patient was instructed to contact PT with any questions or concerns about HEP moving forward. Patient verbally stated she/he understood. Access Code: [email or web address]  URL: [email or web address]  Date: 05/03/2021  Prepared by: Oli Gingras    Exercises  Seated Table Hamstring Stretch - 2 x daily - 7 x weekly - 5 reps - 30\" hold  Long Sitting Calf Stretch with Strap - 2 x daily - 7 x weekly - 5 reps - 30\" hold  Long Sitting Quad Set - 1 x daily - 7 x weekly - 10 sets - 10\" hold  Supine Hip Adduction Isometric with Ball - 1 x daily - 7 x weekly - 10 reps - 10\" hold  Supine Active Straight Leg Raise - 1 x daily - 7 x weekly - 2 sets - 5 reps  Hooklying Clamshell with Resistance - 1 x daily - 7 x weekly - 3 sets - 10 reps  Clamshell - 1 x daily - 7 x weekly - 2 sets - 10 reps      Therapeutic Exercise and NMR EXR  [x] (02143) Provided verbal/tactile cueing for activities related to strengthening, flexibility, endurance, ROM for improvements in LE, proximal hip, and core control with self care, mobility, lifting, ambulation. [x] (45704) Provided verbal/tactile cueing for activities related to improving balance, coordination, kinesthetic sense, posture, motor skill, proprioception  to assist with LE, proximal hip, and core control in self care, mobility, lifting, ambulation and eccentric single leg control.      NMR and Therapeutic Activities:    [x] (63055 or 72806) Provided verbal/tactile cueing for activities related to improving balance, coordination, kinesthetic sense, posture, motor skill, proprioception and motor activation to allow for proper function of core, proximal hip and LE with self care and ADLs  [] (47836) Gait Re-education- Provided training and instruction to the patient for proper LE, core and proximal hip recruitment and positioning and eccentric body weight control with ambulation re-education including up and down stairs     Home Exercise Program:    [x] (44517) Reviewed/Progressed HEP activities related to strengthening, flexibility, endurance, ROM of core, proximal hip and LE for functional self-care, mobility, lifting and ambulation/stair navigation   [] (86733)Reviewed/Progressed HEP activities related to improving balance, coordination, kinesthetic sense, posture, motor skill, proprioception of core, proximal hip and LE for self care, mobility, lifting, and ambulation/stair navigation      Manual Treatments:  PROM / STM / Oscillations-Mobs:  G-I, II, III, IV (PA's, Inf., Post.)  [x] (93925) Provided manual therapy to mobilize LE, proximal hip and/or LS spine soft tissue/joints for the purpose of modulating pain, promoting relaxation,  increasing ROM, reducing/eliminating soft tissue swelling/inflammation/restriction, improving soft tissue extensibility and allowing for proper ROM for normal function with self care, mobility, lifting and ambulation. Modalities:  Declined    Charges:  Timed Code Treatment Minutes: 50   Total Treatment Minutes: 28   [phone]    [] EVAL (LOW) 72174 (typically 20 minutes face-to-face)  [] EVAL (MOD) 93886 (typically 30 minutes face-to-face)  [] EVAL (HIGH) 51565 (typically 45 minutes face-to-face)  [] RE-EVAL     [x] PN(17648) x 2    [] IONTO  [x] NMR (49283) x 1    [] VASO  [] Manual (19248) x      [] Other:  [] TA x 1     [] Mech Traction (23754)  [] ES(attended) (37558)      [] ES (un) (20051):     GOALS:   Patient stated goal: Reduce pain in knee    [] Progressing: [] Met: [] Not Met: [] Adjusted    Therapist goals for Patient:   Short Term Goals: To be achieved in: 2 weeks  1. Independent in HEP and progression per patient tolerance, in order to prevent re-injury. [] Progressing: [] Met: [] Not Met: [] Adjusted   2.  Patient will have a decrease in pain to facilitate improvement in movement, function, and ADLs as indicated adequate occlusion present. Patient tolerated addition of BFR Wall Sit and inc in weight for Leg Extension. Modified hamstring curls to prone lying versus standing and patient noted significant increase in difficulty and muscle fatigue. Added wall sits to HEP and pt agreeable. Patient still requires further therapy to address decreased ROM, decreased strength, decreased balance, increased pain, and impaired gait mechanics that is causing a decrease in their overall functional mobility in order to safely return patient to their PLOF. PLAN: See eval  [x] Continue per plan of care [] Alter current plan (see comments above)  [] Plan of care initiated [] Hold pending MD visit [] Discharge    Electronically signed by:  Renetta Bray, PT, DPT, OCS  Victor Manuel Salinas, SPT  Therapist was present, directed the patient's care, made skilled judgement, and was responsible for assessment, treatment, and progression of the patient. 5/13/2021    Note: If patient does not return for scheduled/ recommended follow up visits, this note will serve as a discharge from care along with most recent update on progress.

## 2021-05-17 ENCOUNTER — APPOINTMENT (OUTPATIENT)
Dept: PHYSICAL THERAPY | Age: 51
End: 2021-05-17
Payer: COMMERCIAL

## 2021-05-19 ENCOUNTER — APPOINTMENT (OUTPATIENT)
Dept: PHYSICAL THERAPY | Age: 51
End: 2021-05-19
Payer: COMMERCIAL

## 2021-05-20 ENCOUNTER — HOSPITAL ENCOUNTER (OUTPATIENT)
Dept: PHYSICAL THERAPY | Age: 51
Setting detail: THERAPIES SERIES
Discharge: HOME | End: 2021-05-20
Payer: COMMERCIAL

## 2021-05-20 PROCEDURE — 97112 NEUROMUSCULAR REEDUCATION: CPT

## 2021-05-20 PROCEDURE — 97110 THERAPEUTIC EXERCISES: CPT

## 2021-05-20 NOTE — FLOWSHEET NOTE
The [address] [place] [address] [facility]    Physical Therapy Daily Treatment Note  Date:  2021    Patient Name:  Amarilis Lea    :  1970  MRN: [id number]  Restrictions/Precautions:    Medical/Treatment Diagnosis Information:  · Diagnosis: M17.11 Right Knee Osteoarthritis  · Treatment Diagnosis: M25.561 R Knee Pain; R53.1 Weakness; M62.559 Atrophy of unspecified thigh  Insurance/Certification information:  PT Insurance Information: PT BENEFITS  UHC/ EFFECTIVE 2020/ ACTIVE/ DED 0/ COPAY 20/ PAYS 100%/ 20 VPCY HARD MAX/ 0 AUTH/ ALL CODES BILLABLE/ TELEHEALTH NOT COVERED/ [facility] REF# [id number]/ 2021 PAG  Physician Information:  Referring Practitioner: Dr. Kristen Maravilla  Has the plan of care been signed (Y/N):        []  Yes  [x]  No     Date of Patient follow up with Physician: not scheduled      Is this a Progress Report:     []  Yes  [x]  No        If Yes:  Date Range for reporting period:  Beginnin/3/21  Ending:    Progress report will be due (10 Rx or 30 days whichever is less):        Recertification will be due (POC Duration  / 90 days whichever is less): 8/3/21         Visit # Insurance Allowable Auth Required   4 20 VPCY []  Yes []  No        OUTCOME MEASURE DATE DEFICIT   LEFS 5/3/21 IE 77% Deficit                 Latex Allergy:  [x]NO      []YES  Preferred Language for Healthcare:   [x]English       []other:    Pain level:  7-8/10 constant    SUBJECTIVE: Patient reports that he went to a wedding on Saturday and stood/walked for awhile. Felt swelling and stiffness by the end of the night. Swelling lasts for about 45 minutes with rest. He doesn't feel this with cycling. Knee \"cracks\" almost every time he stands up. Reports doing heel slides and wall sits a few times since last visit.      OBJECTIVE:       5/3/21  Flexibility L R Comment   Hamstring -45 -45 90/90   Gastroc  Mod Mod     ITB Mod Mod Obers   Quad mild mild Elys              5/3/21  ROM PROM AROM Overpressure Comment     L R L R L R     Flexion   130 130 95         Extension     +5 0                                                5/3/21  Strength L R Comment   Quad 5 3+ QS   Hamstring 4+ 4     Gastroc         Hip  flexion         Hip abd 4+ 4-                            5/3/21  Special Test Results/Comment   Meniscal Click Neg   Crepitus Pos on both PF joints   Flexion Test Mild MJL pain at end range   Valgus Laxity     Varus Laxity     Lachmans     Drop Back     Homans Neg            5/3/21  Girth L R   Mid Patella 43.5 43.5   Suprapatellar 45.5 46.0   5cm above 49.0 48.5   15cm above          Reflexes/Sensation:               [x]? Dermatomes/Myotomes intact               [x]? Reflexes equal and normal bilaterally              []?Other:     Joint mobility:                []?Normal                       [x]? Hypo: decrease PF mobility mild on right              []?Hyper     Palpation: TTP along MJL, pes anserine, and lateral infrapatellar fat pads     Functional Mobility/Transfers: ind     Posture: varus     Bandages/Dressings/Incisions: healed scope incisions     Gait: (include devices/WB status) antalgic on right     Orthopedic Special Tests: see above.       RESTRICTIONS/PRECAUTIONS: Right Knee Surgery October 2020 by Dr. Rees Player;   Exercises/Interventions:  Exercise/Equipment Resistance/Repetitions Other comments   Stretching       Hamstring 5x30\" Prop   Hip Flexor     ITB     Figure 4     Quad     Inclined Calf     Towel Pull 5x30\" Prop           ROM       EOB Self-Assist     Sheet Pull     Wall Slide     Manual     Biodex Passive     Recumbent Bike 5' Level 3; seat 14   ERMI     Hang Weights     Ankle Pumps             Patellar Glides       Medial       Superior       Inferior               Isometrics       HEPHEP        SLR       HEP   Prone     Abduction     Adducton     SLR+               Glutes       Bridges 3x10 Bolster squeeze   HEPHEPSide Stepping       Monster walks CKC       Weight Shift     Calf raises 3x10 Biodex %WBing   Wall sits BFR - see below    Step ups  Try NPV     Squatting     CC TKE     SL DL               PRE       Extension BFR - see below RANGE: 90-30   Flexion BFR - see below RANGE: Avail   Leg Press BFR - see below RANGE: 80-10   Cable Column               Balance       Tandem Stance     Tilt board       SLS      Biodex balance 3 min; PS L10              Other       Treadmill       Gait Training          Manual Interventions          Patient have access to gym? [] Yes  [] No  Patient have equipment at home? [] Yes  [] No     Blood Flow Restriction Training  Smart Cuff Size: 4  LOP: 272mmHg  PTP (60-80% of LOP): 217mmHg  Exercise 10 rep Max Repetition Weight Repetitions   Leg Press  50# 30, 15, 15, 15   Hamstring Curls  0# - 2# prone  30, 15, 15, 15   Leg Extension  3# 30, 15, 15, 15   Wall Sit  0# 5 x 30\"         BFR protocol with Reps of 30/15/15/15 with 30-60 seconds rest in between sets and cuff depressed between exercises. Cuff pressure set at 60-80% of LOP measured with doppler ultrasound at posterior tibial pulse and/or dorsalis pedis pulse. Patient was fully educated on Blood Flow Restriction (BFR) protocol this visit; this included how to properly don/doff Smart Cuff as well as how to properly inflate Smart Cuff. They were educated about what symptoms to monitor for while performing BFR and were instructed to immediately stop BFR and release pressure if they experience any numbness/tingling in extremity, intense pain beneath cuff, loss of sensation in extremity or feeling of coldness in extremity. The patient has been medically cleared by MD for initiation of BFR therapy and verbal consent was obtained from patient prior to initiation of BFR therapy.       Patient Education:   5/3/21: Patient educated about PT diagnosis, prognosis, and plan of care; educated on role of physical therapy; educated on HEP; educated on anatomy of knee joint; discussed role of quad strength with OA and knee pain; discussed BFR therapy in-depth and it's role in PT/strength building    HEP:  Patient instructed on HEP on this date with handout provided and all questions answered. Discussions about how to progress sets/reps/resistance as necessary for fatigue and challenge. Patient was instructed to contact PT with any questions or concerns about HEP moving forward. Patient verbally stated she/he understood. Access Code: [email or web address]  URL: [email or web address]  Date: 05/03/2021  Prepared by: Oli Kaiser    Exercises  Seated Table Hamstring Stretch - 2 x daily - 7 x weekly - 5 reps - 30\" hold  Long Sitting Calf Stretch with Strap - 2 x daily - 7 x weekly - 5 reps - 30\" hold  Long Sitting Quad Set - 1 x daily - 7 x weekly - 10 sets - 10\" hold  Supine Hip Adduction Isometric with Ball - 1 x daily - 7 x weekly - 10 reps - 10\" hold  Supine Active Straight Leg Raise - 1 x daily - 7 x weekly - 2 sets - 5 reps  Hooklying Clamshell with Resistance - 1 x daily - 7 x weekly - 3 sets - 10 reps  Clamshell - 1 x daily - 7 x weekly - 2 sets - 10 reps  Supine Bridge with Mini Swiss Ball Between Knees - 1 x daily - 7 x weekly - 10 reps - 3 sets  Wall Quarter Squat - 1 x daily - 7 x weekly - 5 sets - 30\" hold  Standing Heel Raise - 1 x daily - 7 x weekly - 10 reps - 3 sets        Therapeutic Exercise and NMR EXR  [x] (62702) Provided verbal/tactile cueing for activities related to strengthening, flexibility, endurance, ROM for improvements in LE, proximal hip, and core control with self care, mobility, lifting, ambulation. [x] (67388) Provided verbal/tactile cueing for activities related to improving balance, coordination, kinesthetic sense, posture, motor skill, proprioception  to assist with LE, proximal hip, and core control in self care, mobility, lifting, ambulation and eccentric single leg control.      NMR and Therapeutic Activities:    [x] (88411 or 30736) Provided goal: Reduce pain in knee    [] Progressing: [] Met: [] Not Met: [] Adjusted    Therapist goals for Patient:   Short Term Goals: To be achieved in: 2 weeks  1. Independent in HEP and progression per patient tolerance, in order to prevent re-injury. [] Progressing: [] Met: [] Not Met: [] Adjusted   2. Patient will have a decrease in pain to facilitate improvement in movement, function, and ADLs as indicated by Functional Deficits. [] Progressing: [] Met: [] Not Met: [] Adjusted    Long Term Goals: To be achieved in: 12 weeks  1. Disability index score of 38% or less for the LEFS to assist with reaching prior level of function. [] Progressing: [] Met: [] Not Met: [] Adjusted  2. Patient will demonstrate increased AROM to 0-125 without pain to allow for proper joint functioning as indicated by patients Functional Deficits. [] Progressing: [] Met: [] Not Met: [] Adjusted  3. Patient will demonstrate an increase in Strength to g5/5 in BLE  to allow for proper functional mobility as indicated by patients Functional Deficits. [] Progressing: [] Met: [] Not Met: [] Adjusted  4. Patient will return to ADL and other functional activities without increased symptoms or restriction. [] Progressing: [] Met: [] Not Met: [] Adjusted  5. Patient will tolerate walking >30 minutes with <2/10 pain in knee (patient specific functional goal)    [] Progressing: [] Met: [] Not Met: [] Adjusted    Overall Progression Towards Functional goals/ Treatment Progress Update:  [] Patient is progressing as expected towards functional goals listed. [] Progression is slowed due to complexities/Impairments listed. [] Progression has been slowed due to co-morbidities.   [x] Plan just implemented, too soon to assess goals progression <30days   [] Goals require adjustment due to lack of progress  [] Patient is not progressing as expected and requires additional follow up with physician  [] Other    Prognosis for POC: [x] Good [] Fair  [] Poor    Patient requires continued skilled intervention: [x] Yes  [] No    Treatment/Activity Tolerance:  [x] Patient able to complete treatment  [] Patient limited by fatigue  [] Patient limited by pain     [] Patient limited by other medical complications  [x] Other: Challenged by BFR program today, fatigued at end of session. No limitations with knee pain. (+) skin color changes showing adequate occlusion present. First set of unweighted prone HS curls were easier this week, so increased to 2# for the remaining 3 sets. Also able to increase weight for BFR leg press. Tolerated addition of supine bridges, biodex balance, and biodex calf raises after BFR. Did not have any difficulty maintaining equal WBing on calf raises whiel doing them on Bidoex %WBing program. Very fatigued by end of visit with heavy antalgic gait noticeable. Reviewed importance/role/frequency of HEP with patient. Patient still requires further therapy to address decreased ROM, decreased strength, decreased balance, increased pain, and impaired gait mechanics that is causing a decrease in their overall functional mobility in order to safely return patient to their PLOF. PLAN: See eval  [x] Continue per plan of care [] Alter current plan (see comments above)  [] Plan of care initiated [] Hold pending MD visit [] Discharge    Electronically signed by:  Keila Christianson, PT, DPT, ZAHIRA Hernandez  Therapist was present, directed the patient's care, made skilled judgement, and was responsible for assessment, treatment, and progression of the patient. 5/20/2021    Note: If patient does not return for scheduled/ recommended follow up visits, this note will serve as a discharge from care along with most recent update on progress.

## 2021-05-24 ENCOUNTER — APPOINTMENT (OUTPATIENT)
Dept: PHYSICAL THERAPY | Age: 51
End: 2021-05-24
Payer: COMMERCIAL

## 2021-05-25 ENCOUNTER — HOSPITAL ENCOUNTER (OUTPATIENT)
Dept: PHYSICAL THERAPY | Age: 51
Setting detail: THERAPIES SERIES
Discharge: HOME | End: 2021-05-25
Payer: COMMERCIAL

## 2021-05-25 PROCEDURE — 97110 THERAPEUTIC EXERCISES: CPT

## 2021-05-25 PROCEDURE — 97112 NEUROMUSCULAR REEDUCATION: CPT

## 2021-05-25 PROCEDURE — 97530 THERAPEUTIC ACTIVITIES: CPT

## 2021-05-25 NOTE — FLOWSHEET NOTE
The [address] [place] [address] [facility]    Physical Therapy Daily Treatment Note  Date:  2021    Patient Name:  Brandy Lim    :  1970  MRN: [id number]  Restrictions/Precautions:    Medical/Treatment Diagnosis Information:  · Diagnosis: M17.11 Right Knee Osteoarthritis  · Treatment Diagnosis: M25.561 R Knee Pain; R53.1 Weakness; M62.559 Atrophy of unspecified thigh  Insurance/Certification information:  PT Insurance Information: PT BENEFITS  UHC/ EFFECTIVE 2020/ ACTIVE/ DED 0/ COPAY 20/ PAYS 100%/ 20 VPCY HARD MAX/ 0 AUTH/ ALL CODES BILLABLE/ TELEHEALTH NOT COVERED/ [facility] REF# [id number]/ 2021 PAG  Physician Information:  Referring Practitioner: Dr. Agustín Bush  Has the plan of care been signed (Y/N):        []  Yes  [x]  No     Date of Patient follow up with Physician: not scheduled      Is this a Progress Report:     []  Yes  [x]  No        If Yes:  Date Range for reporting period:  Beginnin/3/21  Ending:    Progress report will be due (10 Rx or 30 days whichever is less): 97       Recertification will be due (POC Duration  / 90 days whichever is less): 8/3/21         Visit # Insurance Allowable Auth Required   5 20 VPCY []  Yes []  No        OUTCOME MEASURE DATE DEFICIT   LEFS 5/3/21 IE 77% Deficit                 Latex Allergy:  [x]NO      []YES  Preferred Language for Healthcare:   [x]English       []other:    Pain level:  7-8/10 constant    SUBJECTIVE: Pretty sore after last session, but iced at night and resolved by the next day. Still having the same issues with swelling and stiffness, especially at work. Pointed to pain along lateral joint line. Reports no issues with HEP and feels fatigued after.     OBJECTIVE:       5/3/21  Flexibility L R Comment   Hamstring -45 -45 90/90   Gastroc  Mod Mod     ITB Mod Mod Obers   Quad mild mild Elys                5/3/21  ROM PROM AROM Overpressure Comment     L R L R L R     Flexion   130 130 95 Adjusted    Therapist goals for Patient:   Short Term Goals: To be achieved in: 2 weeks  1. Independent in HEP and progression per patient tolerance, in order to prevent re-injury. [] Progressing: [] Met: [] Not Met: [] Adjusted   2. Patient will have a decrease in pain to facilitate improvement in movement, function, and ADLs as indicated by Functional Deficits. [] Progressing: [] Met: [] Not Met: [] Adjusted    Long Term Goals: To be achieved in: 12 weeks  1. Disability index score of 38% or less for the LEFS to assist with reaching prior level of function. [] Progressing: [] Met: [] Not Met: [] Adjusted  2. Patient will demonstrate increased AROM to 0-125 without pain to allow for proper joint functioning as indicated by patients Functional Deficits. [] Progressing: [] Met: [] Not Met: [] Adjusted  3. Patient will demonstrate an increase in Strength to g5/5 in BLE  to allow for proper functional mobility as indicated by patients Functional Deficits. [] Progressing: [] Met: [] Not Met: [] Adjusted  4. Patient will return to ADL and other functional activities without increased symptoms or restriction. [] Progressing: [] Met: [] Not Met: [] Adjusted  5. Patient will tolerate walking >30 minutes with <2/10 pain in knee (patient specific functional goal)    [] Progressing: [] Met: [] Not Met: [] Adjusted    Overall Progression Towards Functional goals/ Treatment Progress Update:  [] Patient is progressing as expected towards functional goals listed. [] Progression is slowed due to complexities/Impairments listed. [] Progression has been slowed due to co-morbidities.   [x] Plan just implemented, too soon to assess goals progression <30days   [] Goals require adjustment due to lack of progress  [] Patient is not progressing as expected and requires additional follow up with physician  [] Other    Prognosis for POC: [x] Good [] Fair  [] Poor    Patient requires continued skilled intervention: [x] Yes  [] No    Treatment/Activity Tolerance:  [x] Patient able to complete treatment  [] Patient limited by fatigue  [] Patient limited by pain     [] Patient limited by other medical complications  [x] Other: Patient is progressing, but very challenged by BFR program today. No limitations with knee pain. (+) skin color changes showing adequate occlusion present. Able to increase weight on BFR HS curls and leg press. Good tolerance to the addition of 6\" step ups. Fatigue noticeable during %WB calf raises, putting more weight through L leg during ecc lowering. Very fatigued by end of visit with heavy antalgic gait noticeable. Reviewed importance/role/frequency of HEP with patient. Patient still requires further therapy to address decreased ROM, decreased strength, decreased balance, increased pain, and impaired gait mechanics that is causing a decrease in their overall functional mobility in order to safely return patient to their PLOF. PLAN: See eval  [x] Continue per plan of care [] Alter current plan (see comments above)  [] Plan of care initiated [] Hold pending MD visit [] Discharge    Electronically signed by:  Cody Kaplan, PT, DPT, OCS  Bartholome Reason, SPT  Therapist was present, directed the patient's care, made skilled judgement, and was responsible for assessment, treatment, and progression of the patient. 5/25/2021    Note: If patient does not return for scheduled/ recommended follow up visits, this note will serve as a discharge from care along with most recent update on progress.

## 2021-05-26 ENCOUNTER — APPOINTMENT (OUTPATIENT)
Dept: PHYSICAL THERAPY | Age: 51
End: 2021-05-26
Payer: COMMERCIAL

## 2021-05-27 ENCOUNTER — HOSPITAL ENCOUNTER (OUTPATIENT)
Dept: PHYSICAL THERAPY | Age: 51
Setting detail: THERAPIES SERIES
Discharge: HOME | End: 2021-05-27
Payer: COMMERCIAL

## 2021-05-27 PROCEDURE — 97110 THERAPEUTIC EXERCISES: CPT

## 2021-05-27 PROCEDURE — 97530 THERAPEUTIC ACTIVITIES: CPT

## 2021-05-27 PROCEDURE — 97112 NEUROMUSCULAR REEDUCATION: CPT

## 2021-05-27 NOTE — FLOWSHEET NOTE
[facility] [address] [place] [address] [facility]    [facility] Therapy Daily Treatment Note  Date:  2021    Patient Name:  Claire Husbands    :  1970  MRN: [id number]  Restrictions/Precautions:    Medical/Treatment Diagnosis Information:  · Diagnosis: M17.11 Right Knee Osteoarthritis  · Treatment Diagnosis: M25.561 R Knee Pain; R53.1 Weakness; M62.559 Atrophy of unspecified thigh  Insurance/Certification information:  PT Insurance Information: PT BENEFITS  [employer]/ EFFECTIVE 2020/ ACTIVE/ DED 0/ COPAY 20/ PAYS 100%/ 20 V[facility] HARD MAX/ 0 AUTH/ ALL CODES BILLABLE/ TELEHEALTH NOT COVERED/ [facility] REF# [id number]/ 2021 PAG  Physician Information:  Referring Practitioner: Dr. Amber Hooks  Has the plan of care been signed (Y/N):        []  Yes  [x]  No     Date of Patient follow up with Physician: not scheduled      Is this a Progress Report:     []  Yes  [x]  No        If Yes:  Date Range for reporting period:  Beginnin/3/21  Ending:    Progress report will be due (10 Rx or 30 days whichever is less): 78       Recertification will be due (POC Duration  / 90 days whichever is less): 8/3/21         Visit # Insurance Allowable Auth Required   6 20 VPCY []  Yes []  No        OUTCOME MEASURE DATE DEFICIT   LEFS 5/3/21 IE 77% Deficit                 Latex Allergy:  [x]NO      []YES  Preferred Language for Healthcare:   [x]English       []other:    Pain level:  7-8/10 constant    SUBJECTIVE:  Felt pretty sore after last visit and the next day at work, but not painful. Nothing has changed since last visit.      OBJECTIVE:       5/3/21  Flexibility L R Comment   Hamstring -45 -45 90/90   Gastroc  Mod Mod     ITB Mod Mod Obers   Quad mild mild Elys                5/3/21  ROM PROM AROM Overpressure Comment     L R L R L R     Flexion   130 130 95         Extension     +5 0                                                5/3/21  Strength L R Comment   Quad 5 3+ QS   Hamstring 4+ 4   Gastroc         Hip  flexion         Hip abd 4+ 4-                            5/3/21  Special Test Results/Comment   Meniscal Click Neg   Crepitus Pos on both PF joints   Flexion Test Mild MJL pain at end range   Valgus Laxity     Varus Laxity     Lachmans     Drop Back     Homans Neg            5/3/21  Girth L R   Mid Patella 43.5 43.5   Suprapatellar 45.5 46.0   5cm above 49.0 48.5   15cm above          Reflexes/Sensation:               [x]? Dermatomes/Myotomes intact               [x]? Reflexes equal and normal bilaterally              []?Other:     Joint mobility:                []?Normal                       [x]? Hypo: decrease PF mobility mild on right              []?Hyper     Palpation: TTP along MJL, pes anserine, and lateral infrapatellar fat pads     Functional Mobility/Transfers: ind     Posture: varus     Bandages/Dressings/Incisions: healed scope incisions     Gait: (include devices/WB status) antalgic on right     Orthopedic Special Tests: see above.       RESTRICTIONS/PRECAUTIONS: Right Knee Surgery October 2020 by Dr. Lula Jj;   Exercises/Interventions:  Exercise/Equipment Resistance/Repetitions Other comments   Stretching       Hamstring 5x30\" Prop   Hip Flexor     ITB     Figure 4     Quad     Inclined Calf     Towel Pull 5x30\" Prop           ROM       EOB Self-Assist     Sheet Pull     Wall Slide     Manual     Biodex Passive     Recumbent Bike 5' Level 4.5; seat 14   ERMI     Hang Weights     Ankle Pumps             Patellar Glides       Medial       Superior       Inferior               Isometrics       HEPHEP        SLR       HEP   Prone     Abduction     Adducton     SLR+               Glutes       Bridges 3x10 Bolster squeeze   HEPHEPSide Stepping       Monster walks               CKC       Weight Shift     Calf raises 3x10 Biodex %WBing   Wall sits BFR - see below    Step ups 3x10   6\"   Squatting     CC TKE     SL DL               PRE       Extension BFR - see below RANGE: 90-30   Flexion BFR - see below RANGE: Avail   Leg Press BFR - see below RANGE: 80-10   Cable Column               Balance       Tandem Stance     Tilt board       SLS NPV     Biodex balance 3 min; PS L10              Other       Treadmill       Gait Training 10' Cone walking - cues for heel-toe sequence at heel strike        Manual Interventions          Patient have access to gym? [] Yes  [] No  Patient have equipment at home? [] Yes  [] No     Blood Flow Restriction Training  Smart Cuff Size: 4  LOP: 230mmHg  PTP (60-80% of LOP): 184mmHg  Exercise 10 rep Max Repetition Weight Repetitions   Leg Press  55# 30, 15, 15, 15   Hamstring Curls  3# prone  30, 15, 15, 15   Leg Extension  3# (Inc NPV) 30, 15, 15, 15   Wall Sit  0# 5 x 30\"         BFR protocol with Reps of 30/15/15/15 with 30-60 seconds rest in between sets and cuff depressed between exercises. Cuff pressure set at 60-80% of LOP measured with doppler ultrasound at posterior tibial pulse and/or dorsalis pedis pulse. Patient was fully educated on Blood Flow Restriction (BFR) protocol this visit; this included how to properly don/doff Smart Cuff as well as how to properly inflate Smart Cuff. They were educated about what symptoms to monitor for while performing BFR and were instructed to immediately stop BFR and release pressure if they experience any numbness/tingling in extremity, intense pain beneath cuff, loss of sensation in extremity or feeling of coldness in extremity. The patient has been medically cleared by MD for initiation of BFR therapy and verbal consent was obtained from patient prior to initiation of BFR therapy.       Patient Education:   5/3/21: Patient educated about PT diagnosis, prognosis, and plan of care; educated on role of physical therapy; educated on HEP; educated on anatomy of knee joint; discussed role of quad strength with OA and knee pain; discussed BFR therapy in-depth and it's role in PT/strength building    HEP:  Patient instructed on HEP on this date with handout provided and all questions answered. Discussions about how to progress sets/reps/resistance as necessary for fatigue and challenge. Patient was instructed to contact PT with any questions or concerns about HEP moving forward. Patient verbally stated she/he understood. Access Code: [email or web address]  URL: [email or web address]  Date: 05/03/2021  Prepared by: Oli Kaiser    Exercises  Seated Table Hamstring Stretch - 2 x daily - 7 x weekly - 5 reps - 30\" hold  Long Sitting Calf Stretch with Strap - 2 x daily - 7 x weekly - 5 reps - 30\" hold  Long Sitting Quad Set - 1 x daily - 7 x weekly - 10 sets - 10\" hold  Supine Hip Adduction Isometric with Ball - 1 x daily - 7 x weekly - 10 reps - 10\" hold  Supine Active Straight Leg Raise - 1 x daily - 7 x weekly - 2 sets - 5 reps  Hooklying Clamshell with Resistance - 1 x daily - 7 x weekly - 3 sets - 10 reps  Clamshell - 1 x daily - 7 x weekly - 2 sets - 10 reps  Supine Bridge with Mini Swiss Ball Between Knees - 1 x daily - 7 x weekly - 10 reps - 3 sets  Wall Quarter Squat - 1 x daily - 7 x weekly - 5 sets - 30\" hold  Standing Heel Raise - 1 x daily - 7 x weekly - 10 reps - 3 sets        Therapeutic Exercise and NMR EXR  [x] (65593) Provided verbal/tactile cueing for activities related to strengthening, flexibility, endurance, ROM for improvements in LE, proximal hip, and core control with self care, mobility, lifting, ambulation. [x] (75493) Provided verbal/tactile cueing for activities related to improving balance, coordination, kinesthetic sense, posture, motor skill, proprioception  to assist with LE, proximal hip, and core control in self care, mobility, lifting, ambulation and eccentric single leg control.      NMR and Therapeutic Activities:    [x] (69712 or 12535) Provided verbal/tactile cueing for activities related to improving balance, coordination, kinesthetic sense, posture, motor skill, proprioception and motor activation to allow for proper function of core, proximal hip and LE with self care and ADLs  [] (41965) Gait Re-education- Provided training and instruction to the patient for proper LE, core and proximal hip recruitment and positioning and eccentric body weight control with ambulation re-education including up and down stairs     Home Exercise Program:    [x] (17140) Reviewed/Progressed HEP activities related to strengthening, flexibility, endurance, ROM of core, proximal hip and LE for functional self-care, mobility, lifting and ambulation/stair navigation   [] (31668)Reviewed/Progressed HEP activities related to improving balance, coordination, kinesthetic sense, posture, motor skill, proprioception of core, proximal hip and LE for self care, mobility, lifting, and ambulation/stair navigation      Manual Treatments:  PROM / STM / Oscillations-Mobs:  G-I, II, III, IV (PA's, Inf., Post.)  [x] (49914) Provided manual therapy to mobilize LE, proximal hip and/or LS spine soft tissue/joints for the purpose of modulating pain, promoting relaxation,  increasing ROM, reducing/eliminating soft tissue swelling/inflammation/restriction, improving soft tissue extensibility and allowing for proper ROM for normal function with self care, mobility, lifting and ambulation. Modalities:  Declined    Charges:  Timed Code Treatment Minutes: 67   Total Treatment Minutes: 20   [phone]    [] EVAL (LOW) 64124 (typically 20 minutes face-to-face)  [] EVAL (MOD) 94111 (typically 30 minutes face-to-face)  [] EVAL (HIGH) 17833 (typically 45 minutes face-to-face)  [] RE-EVAL     [x] LJ(70199) x 2    [] IONTO  [x] NMR (43920) x 1    [] VASO  [] Manual (75012) x      [] Other:  [x] TA x 1     [] Mech Traction (08922)  [] ES(attended) (43224)      [] ES (un) (50490):     GOALS:   Patient stated goal: Reduce pain in knee    [] Progressing: [] Met: [] Not Met: [] Adjusted    Therapist goals for Patient:   Short Term Goals: To be achieved in: 2 weeks  1.  Independent fatigue  [] Patient limited by pain     [] Patient limited by other medical complications  [x] Other: Patient is progressing, but very challenged by BFR program. Significant muscle soreness noted by the second set of BFR leg press today despite no change in weight. Increased shakiness and fatigue with BFR wall sits. Patient has been demonstrating heavy antalgic gait at the end of BFR, so introduced cone walking. Demonstrated good carry over while walking around the clinic with heel-toe pattern and knee extension in stance. Patient still requires further therapy to address decreased ROM, decreased strength, decreased balance, increased pain, and impaired gait mechanics that is causing a decrease in their overall functional mobility in order to safely return patient to their PLOF. PLAN: See eval  [x] Continue per plan of care [] Alter current plan (see comments above)  [] Plan of care initiated [] Hold pending MD visit [] Discharge    Electronically signed by:  Ania Richey, PT, DPT, OCS  [employer], [facility]  Therapist was present, directed the patient's care, made skilled judgement, and was responsible for assessment, treatment, and progression of the patient. 5/27/2021    Note: If patient does not return for scheduled/ recommended follow up visits, this note will serve as a discharge from care along with most recent update on progress.

## 2021-06-01 ENCOUNTER — HOSPITAL ENCOUNTER (OUTPATIENT)
Dept: PHYSICAL THERAPY | Age: 51
Setting detail: THERAPIES SERIES
Discharge: HOME | End: 2021-06-01
Payer: COMMERCIAL

## 2021-06-01 NOTE — FLOWSHEET NOTE
The [address]    Physical Therapy  Cancellation/No-show Note  Patient Name:  Mervin Evangelista  :  1970   Date:  2021  Cancelled visits to date: 3  No-shows to date: 0    For today's appointment patient:  [x]  Cancelled  []  Rescheduled appointment  []  No-show     Reason given by patient:  []  Patient ill  []  Conflicting appointment   []  No transportation    [x]  Conflict with work  []  No reason given  []  Other:     Comments:      Electronically signed by:  Renetta Bray, PT, PT, DPT, OCS

## 2021-06-03 ENCOUNTER — HOSPITAL ENCOUNTER (OUTPATIENT)
Dept: PHYSICAL THERAPY | Age: 51
Setting detail: THERAPIES SERIES
Discharge: HOME | End: 2021-06-03
Payer: COMMERCIAL

## 2021-06-03 PROCEDURE — 97110 THERAPEUTIC EXERCISES: CPT

## 2021-06-03 PROCEDURE — 97530 THERAPEUTIC ACTIVITIES: CPT

## 2021-06-03 PROCEDURE — 97112 NEUROMUSCULAR REEDUCATION: CPT

## 2021-06-03 NOTE — PROGRESS NOTES
The [address] [place] [address] and Sports Rehabilitation, Linda Mccabe   Physical Therapy Progress Note      Overall Response to Treatment:   []Patient is responding well to treatment and improvement is noted with regards  to goals   []Patient should continue to improve in reasonable time if they continue HEP   []Patient has plateaued and is no longer responding to skilled PT intervention    []Patient is getting worse and would benefit from return to referring MD   []Patient unable to adhere to initial POC   [x]Other: Has shown minimal progress to date in therapy. Has had 7 total visits and 6 of those 7 with BFR training. Despite this, he has shown no changes in quad girth and still presents with significant right sided quad atrophy. His symptoms in knee remain largely unchanged with stiffness and pain after prolonged standing/walking getting as intense as 8/10. Patient has limited visits for PT with 20 for the calendar year and a hard max. Due to the fact patient may need surgery in  it was recommended he transition to HEP and gym/aquatic program and hold off on further PT to conserve visits for possibly surgery. A new program was provided and reviewed in detail including the necessity to perform minimum 3x/week and encouragement to join fitness center to work on Reverbeo and possibly aquatic therapy. He will then return to MD to discuss next step in treatment options for right knee. He was instructed to call/e-mail PT in coming weeks to update on status of HEP/gym program as well as knee symptoms. He verbalized agreement in this plan.      Date range of Visits: 5/3/21-6/3/21  Total Visits: 7    Physical Therapy Daily Treatment Note  Date:  6/3/2021    Patient Name:  Loraine Vasquez    :  1970  MRN: [id number]  Restrictions/Precautions:    Medical/Treatment Diagnosis Information:  · Diagnosis: M17.11 Right Knee Osteoarthritis  · Treatment Diagnosis: M25.561 R Knee Pain; R53.1 Weakness; M62.559 Atrophy of unspecified thigh  Insurance/Certification information:  PT Insurance Information: PT BENEFITS  [employer]/ EFFECTIVE 2020/ ACTIVE/ DED 0/ COPAY 20/ PAYS 100%/ 20 [facility] HARD MAX/ 0 AUTH/ ALL CODES BILLABLE/ TELEHEALTH NOT COVERED/ [facility] REF# [id number]/ 2021 PAG  Physician Information:  Referring Practitioner: Dr. Lisa Bhandari  Has the plan of care been signed (Y/N):        []  Yes  [x]  No     Date of Patient follow up with Physician: not scheduled      Is this a Progress Report:     [x]  Yes  []  No        If Yes:  Date Range for reporting period:  Beginnin/3/21  Endin/3/21    Progress report will be due (10 Rx or 30 days whichever is less): 78       Recertification will be due (POC Duration  / 90 days whichever is less): 8/3/21         Visit # Insurance Allowable Auth Required   7 20 VPCY []  Yes []  No        OUTCOME MEASURE DATE DEFICIT   LEFS 5/3/21 IE 77% Deficit   LEFS 6/3/21 67% Deficit         Latex Allergy:  [x]NO      []YES  Preferred Language for Healthcare:   [x]English       []other:    Pain level:  0/10 at rest today, 7-8/10 at worst with prolonged standing/walking     SUBJECTIVE:  Doing fine today. No pain, soreness or fatigue after last week's visit \"I felt good\". States he felt he was walking better after gait training in visit. Overall since starting PT feels his symptoms are unchanged. Still gets significant stiffness that turns into pain with prolonged standing or walking, particularly at work when on concrete floors. Feels this occurs around 30 minutes or less of activity. Reports compliance with HEP a \"couple times a week\".      OBJECTIVE:       6/3/21  Flexibility L R Comment   Hamstring -35 -30 90/90   Gastroc Mod Mod     ITB Mod Mod Obers   Quad mild mild Elys                6/3/21  ROM PROM AROM Overpressure Comment     L R L R L R     Flexion   130 125         Extension     +5 0                                                6/3/21  Strength L R Comment   Quad 5 3+ QS       Gastroc         Hip  flexion         Hip abd 4+ 4                            5/3/21  Special Test Results/Comment   Meniscal Click Neg   Crepitus Pos on both PF joints   Flexion Test Mild MJL pain at end range   Valgus Laxity     Varus Laxity     Lachmans     Drop Back     Homans Neg            6/3/21  Girth L R   Mid Patella 43.5 45.5   Suprapatellar 45.0 46.5   5cm above 50.0 48.0   15cm above 61.0 56.5      Reflexes/Sensation:               [x]? Dermatomes/Myotomes intact               [x]? Reflexes equal and normal bilaterally              []?Other:     Joint mobility:                []?Normal                       [x]? Hypo: decrease PF mobility mild on right              []?Hyper     Palpation: TTP along MJL, pes anserine, and lateral infrapatellar fat pads     Functional Mobility/Transfers: ind     Posture: varus     Bandages/Dressings/Incisions: healed scope incisions     Gait: (include devices/WB status) antalgic on right     Orthopedic Special Tests: see above.       RESTRICTIONS/PRECAUTIONS: Right Knee Surgery October 2020 by Dr. Kapil Oseguera;   E/xercises/Interventions:  Exercise/Equipment Resistance/Repetitions Other comments   Stretching       Hamstring 3x30\"   Hip Flexor     ITB     Figure 4     Quad     Inclined Calf 3x30\"            ROM       EOB Self-Assist     Sheet Pull     Wall Slide     Manual     Biodex Passive     Recumbent Bike 5' Level 4.5; seat 14   ERMI     Hang Weights     Ankle Pumps             Patellar Glides       Medial       Superior       Inferior               Isometrics       HEPHEP        SLR       HEP   Prone     Abduction     Adducton     SLR+               Glutes       HEPHEPHEPSide Stepping       Monster walks               CKC       Weight Shift     Wall sits BFR - see below    Squatting     CC TKE     SL DL               PRE       Extension BFR - see below RANGE: 90-30   Flexion BFR - see below RANGE: Avail   Leg Press BFR - see below RANGE: 80-10   Cable Column Balance       Tandem Stance     Tilt board       SLS 3x20\"                 Other       Treadmill 5'  1.7mph gait    Gait Training 5' [employer] walking - cues for heel-toe sequence at heel strike        Manual Interventions          Patient have access to gym? [] Yes  [] No  Patient have equipment at home? [] Yes  [] No     Blood Flow Restriction Training  Smart Cuff Size: 4  LOP: 260mmHg  PTP (60-80% of LOP): 208 mmHg  Exercise 10 rep Max Repetition Weight Repetitions   Leg Press  55# 30, 15, 15, 15   Hamstring Curls  3# prone  30, 15, 15, 15   Leg Extension  5# 30, 15, 15, 15   Wall Sit  0# 5 x 30\"         BFR protocol with Reps of 30/15/15/15 with 30-60 seconds rest in between sets and cuff depressed between exercises. Cuff pressure set at 60-80% of LOP measured with doppler ultrasound at posterior tibial pulse and/or dorsalis pedis pulse. Patient was fully educated on Blood Flow Restriction (BFR) protocol this visit; this included how to properly don/doff Smart Cuff as well as how to properly inflate Smart Cuff. They were educated about what symptoms to monitor for while performing BFR and were instructed to immediately stop BFR and release pressure if they experience any numbness/tingling in extremity, intense pain beneath cuff, loss of sensation in extremity or feeling of coldness in extremity. The patient has been medically cleared by MD for initiation of BFR therapy and verbal consent was obtained from patient prior to initiation of BFR therapy. Patient Education:   5/3/21: Patient educated about PT diagnosis, prognosis, and plan of care; educated on role of physical therapy; educated on HEP; educated on anatomy of knee joint; discussed role of quad strength with OA and knee pain; discussed BFR therapy in-depth and it's role in PT/strength building    HEP:  Patient instructed on HEP on this date with handout provided and all questions answered.  Discussions about how to progress to allow for proper function of core, proximal hip and LE with self care and ADLs  [] (76958) Gait Re-education- Provided training and instruction to the patient for proper LE, core and proximal hip recruitment and positioning and eccentric body weight control with ambulation re-education including up and down stairs     Home Exercise Program:    [x] (46547) Reviewed/Progressed HEP activities related to strengthening, flexibility, endurance, ROM of core, proximal hip and LE for functional self-care, mobility, lifting and ambulation/stair navigation   [] (18706)Reviewed/Progressed HEP activities related to improving balance, coordination, kinesthetic sense, posture, motor skill, proprioception of core, proximal hip and LE for self care, mobility, lifting, and ambulation/stair navigation      Manual Treatments:  PROM / STM / Oscillations-Mobs:  G-I, II, III, IV (PA's, Inf., Post.)  [x] (64679) Provided manual therapy to mobilize LE, proximal hip and/or LS spine soft tissue/joints for the purpose of modulating pain, promoting relaxation,  increasing ROM, reducing/eliminating soft tissue swelling/inflammation/restriction, improving soft tissue extensibility and allowing for proper ROM for normal function with self care, mobility, lifting and ambulation. Modalities:  Declined    Charges:  Timed Code Treatment Minutes: 60   Total Treatment Minutes: 60   425-600    [] EVAL (LOW) 32119 (typically 20 minutes face-to-face)  [] EVAL (MOD) 39954 (typically 30 minutes face-to-face)  [] EVAL (HIGH) 31780 (typically 45 minutes face-to-face)  [] RE-EVAL     [x] UY(14079) x 2    [] IONTO  [x] NMR (76091) x 1    [] VASO  [] Manual (67658) x      [] Other:  [x] TA x 1     [] Mech Traction (44421)  [] ES(attended) (85450)      [] ES (un) (03245):     GOALS:   Patient stated goal: Reduce pain in knee    [] Progressing: [] Met: [x] Not Met: [] Adjusted    Therapist goals for Patient:   Short Term Goals: To be achieved in: 2 weeks  1. Independent in HEP and progression per patient tolerance, in order to prevent re-injury. [] Progressing: [] Met: [x] Not Met: [] Adjusted   2. Patient will have a decrease in pain to facilitate improvement in movement, function, and ADLs as indicated by Functional Deficits. [] Progressing: [] Met: [x] Not Met: [] Adjusted    Long Term Goals: To be achieved in: 12 weeks  1. Disability index score of 38% or less for the LEFS to assist with reaching prior level of function. [x] Progressing: [] Met: [] Not Met: [] Adjusted  2. Patient will demonstrate increased AROM to 0-125 without pain to allow for proper joint functioning as indicated by patients Functional Deficits. [] Progressing: [x] Met: [] Not Met: [] Adjusted  3. Patient will demonstrate an increase in Strength to g5/5 in BLE  to allow for proper functional mobility as indicated by patients Functional Deficits. [x] Progressing: [] Met: [] Not Met: [] Adjusted  4. Patient will return to ADL and other functional activities without increased symptoms or restriction. [] Progressing: [] Met: [x] Not Met: [] Adjusted  5. Patient will tolerate walking >30 minutes with <2/10 pain in knee (patient specific functional goal)    [] Progressing: [] Met: [x] Not Met: [] Adjusted    Overall Progression Towards Functional goals/ Treatment Progress Update:  [] Patient is progressing as expected towards functional goals listed. [x] Progression is slowed due to complexities/Impairments listed: quad atrophy and underlying OA in medial compartment  [] Progression has been slowed due to co-morbidities.   [] Plan just implemented, too soon to assess goals progression <30days   [] Goals require adjustment due to lack of progress  [] Patient is not progressing as expected and requires additional follow up with physician  [] Other    Prognosis for POC: [x] Good [] Fair  [] Poor    Patient requires continued skilled intervention: [x] Yes  [] No    Treatment/Activity Tolerance:  [x] Patient able to complete treatment  [] Patient limited by fatigue  [] Patient limited by pain     [] Patient limited by other medical complications  [x] Other: See above. Darcy Grant PLAN: See eval  [] Continue per plan of care [] Alter current plan (see comments above)  [] Plan of care initiated [x] Hold pending MD visit [] Discharge    Electronically signed by:  Grace Florez, PT, DPT, OCS  ZAHIRA Devlin  Therapist was present, directed the patient's care, made skilled judgement, and was responsible for assessment, treatment, and progression of the patient. 6/3/2021    Note: If patient does not return for scheduled/ recommended follow up visits, this note will serve as a discharge from care along with most recent update on progress.

## 2021-08-17 ENCOUNTER — HOSPITAL ENCOUNTER (OUTPATIENT)
Dept: PHYSICAL THERAPY | Age: 51
Setting detail: THERAPIES SERIES
Discharge: HOME | End: 2021-08-17
Payer: COMMERCIAL

## 2021-08-17 PROCEDURE — 97110 THERAPEUTIC EXERCISES: CPT | Performed by: PHYSICAL THERAPIST

## 2021-08-17 PROCEDURE — 97016 VASOPNEUMATIC DEVICE THERAPY: CPT | Performed by: PHYSICAL THERAPIST

## 2021-08-17 PROCEDURE — 97161 PT EVAL LOW COMPLEX 20 MIN: CPT | Performed by: PHYSICAL THERAPIST

## 2021-08-17 NOTE — PLAN OF CARE
The [facility] and [facility], [address]                                                        Physical Therapy Certification    Dear Referring Practitioner: Gaston Singh MD,    We had the pleasure of evaluating the following patient for physical therapy services at [address] [place] [address]. A summary of our findings can be found in the initial assessment below. This includes our plan of care. If you have any questions or concerns regarding these findings, please do not hesitate to contact me at the office phone number checked above. Thank you for the referral.       Physician Signature:_______________________________Date:__________________  By signing above (or electronic signature), therapists plan is approved by physician      Patient: Thuy Ramos   : 1970   MRN: [id number]  Referring Physician: Referring Practitioner: Gaston Singh MD      Evaluation Date: 2021      Medical Diagnosis Information:  Diagnosis: s/p Right knee partial knee replacement  DOS: 21  D10: M17.11   Treatment Diagnosis: increased swelling; increased pain; decreased strength; gait abnormality                                         Insurance information: PT Insurance Information: [facility]     Precautions/ Contra-indications: partial knee replacement    C-SSRS Triggered by Intake questionnaire (Past 2 wk assessment):   [x] No, Questionnaire did not trigger screening.   [] Yes, Patient intake triggered further evaluation      [] C-SSRS Screening completed  [] PCP notified via Plan of Care  [] Emergency services notified     Latex Allergy:  [x]NO      []YES  Preferred Language for Healthcare:   [x]English       []other:    SUBJECTIVE: Patient stated complaint:  Pt had a rightpartial knee replacement on 21. He is not using an assistive device. He has not done formal PT since surgery. Pt did do prehab therapy. Pt has intermittent pain at the PF joint. +night pain/position. +stiffness    Relevant Medical History: OA  Functional Disability Index: LEFS= 28%    Pain Scale: 6/10  Easing factors:   Provocative factors: sleeping, standing, walking, kneeling, OH movements, self care, position changes, squatting, reaching, sitting, driving, stairs, lifting    Type: []Constant   [x]Intermittent  []Radiating []Localized []other:     Numbness/Tingling:     Occupation/School:     Living Status/Prior Level of Function: Independent with ADLs and IADLs    OBJECTIVE:      Flexibility L R Comment   Hamstring  moderate    Gastroc      ITB      Quad              ROM PROM AROM Overpressure Comment    L R L R L R    Flexion  130        Extension    -5                              Strength L R Comment   Quad  poor    Hamstring      Gastroc      Hip  flexion      Hip abd                      Special Test Results/Comment   Meniscal Click    Crepitus    Flexion Test    Valgus Laxity    Varus Laxity    Lachmans    Drop Back    Homans            Girth L R   Mid Patella     Suprapatellar     5cm above     15cm above       Reflexes/Sensation:    []Dermatomes/Myotomes intact    []Reflexes equal and normal bilaterally   []Other:    Joint mobility:     []Normal    []Hypo   []Hyper    Palpation:     Functional Mobility/Transfers: WFL    Posture:     Bandages/Dressings/Incisions: wound is healing well    Gait:  Mildly antalgic gait    Orthopedic Special Tests:                        [x] Patient history, allergies, meds reviewed. Medical chart reviewed. See intake form. Review Of Systems (ROS):  [x]Performed Review of systems (Integumentary, CardioPulmonary, Neurological) by intake and observation. Intake form has been scanned into medical record. Patient has been instructed to contact their primary care physician regarding ROS issues if not already being addressed at this time.       Co-morbidities/Complexities (which will affect course of rehabilitation):   []None           Arthritic conditions   []Rheumatoid arthritis (M05.9)  [x]Osteoarthritis (M19.91)   Cardiovascular conditions   []Hypertension (I10)  []Hyperlipidemia (E78.5)  []Angina pectoris (I20)  []Atherosclerosis (I70)   Musculoskeletal conditions   []Disc pathology   []Congenital spine pathologies   [x]Prior surgical intervention  []Osteoporosis (M81.8)  []Osteopenia (M85.8)   Endocrine conditions   []Hypothyroid (E03.9)  []Hyperthyroid Gastrointestinal conditions   []Constipation (H36.65)   Metabolic conditions   [x]Morbid obesity (E66.01)  []Diabetes type 1(E10.65) or 2 (E11.65)   []Neuropathy (G60.9)     Pulmonary conditions   []Asthma (J45)  []Coughing   []COPD (J44.9)   Psychological Disorders  []Anxiety (F41.9)  []Depression (F32.9)   []Other:   []Other:          Barriers to/and or personal factors that will affect rehab potential:              []Age  []Sex              []Motivation/Lack of Motivation                        []Co-Morbidities              []Cognitive Function, education/learning barriers              []Environmental, home barriers              []profession/work barriers  []past PT/medical experience  []other:  Justification: Pt has a good rehab potential.     Falls Risk Assessment (30 days):   [x] Falls Risk assessed and no intervention required.   [] Falls Risk assessed and Patient requires intervention due to being higher risk   TUG score (>12s at risk):     [] Falls education provided, including      ASSESSMENT:   Functional Impairments:     []Noted lumbar/proximal hip/LE hypomobility   []Decreased LE functional ROM   [x]Decreased core/proximal hip strength and neuromuscular control   [x]Decreased LE functional strength   [x]Reduced balance/proprioceptive control   []other:      Functional Activity Limitations (from functional questionnaire and intake)   [x]Reduced ability to tolerate prolonged functional positions   [x]Reduced ability or difficulty with changes of Deficits. [] Progressing: [] Met: [] Not Met: [] Adjusted    Long Term Goals: To be achieved in: 12 weeks  1. Disability index score of 30% or less for the LEFS to assist with reaching prior level of function. [] Progressing: [] Met: [] Not Met: [] Adjusted  2. Patient will demonstrate increased AROM to [facility] to allow for proper joint functioning as indicated by patients Functional Deficits. [] Progressing: [] Met: [] Not Met: [] Adjusted  3. Patient will demonstrate an increase in Strength to good proximal hip strength and control, within 5lb HHD in LE to allow for proper functional mobility as indicated by patients Functional Deficits. [] Progressing: [] Met: [] Not Met: [] Adjusted  4. Patient will return to ADL and household functional activities without increased symptoms or restriction. [] Progressing: [] Met: [] Not Met: [] Adjusted  5. Pt will return to work and recreational activities. [] Progressing: [] Met: [] Not Met: [] Adjusted       Electronically signed by:  Marguerite Navarro PT    Note: If patient does not return for scheduled/ recommended follow up visits, this note will serve as a discharge from care along with most recent update on progress.

## 2021-08-17 NOTE — FLOWSHEET NOTE
The [address] [facility] [place] [address]    Physical Therapy Daily Treatment Note  Date:  2021    Patient Name:  Navjot Mitchell    :  1970  MRN: [id number]  Restrictions/Precautions:    Medical/Treatment Diagnosis Information:  · Diagnosis: s/p Right knee partial knee replacement    · DOS: 21          ICD10: M17.11  · Treatment Diagnosis: increased swelling; increased pain; decreased strength; gait abnormality  Insurance/Certification information:  PT Insurance Information: [facility]  Physician Information:  Referring Practitioner: Alondra Pickering MD  Has the plan of care been signed (Y/N):        []  Yes  [x]  No     Date of Patient follow up with Physician:       Is this a Progress Report:     []  Yes  [x]  No        If Yes:  Date Range for reporting period:  Beginning 21  Ending     Progress report will be due (10 Rx or 30 days whichever is less):       Recertification will be due (POC Duration  / 90 days whichever is less): 21        Visit # Insurance Allowable Auth Required   1  []  Yes []  No        Functional Scale: LEFS= 28%   Date assessed: 21      Latex Allergy:  [x]NO      []YES  Preferred Language for Healthcare:   [x]English       []other:     Pain level:  6/10     SUBJECTIVE:  See eval    OBJECTIVE: See eval   Observation:    Test measurements:      RESTRICTIONS/PRECAUTIONS:  Right knee partial knee replacement    Exercises/Interventions:   Exercise/Equipment Resistance/Repetitions Other comments   Stretching     Hamstring [id number]    Towel Pull [id number]    Inclined Calf     Hip Flexion     ITB     Groin     Quad                    SLR     Supine 3x10    Abduction 3x10    Adduction     Prone     SLR+          Isometrics     Quad sets [id number]         Patellar Glides     Medial     Superior     Inferior          ROM     Sheet Pulls     Hang Weights     Passive     Active     Weight Shift     Ankle Pumps     ERMI [id number]              CKC Calf raises     Wall sits     Step ups     1 leg stand     Squatting     CC TKE     Balance     bridges          PRE     Extension  RANGE:   Flexion  RANGE:        Quantum machines     Leg press      Leg extension     Leg curl          Manual interventions                 Patient Education:  Access Code: [email or web address]  URL: [email or web address]  Date: 08/17/2021  Prepared by: Raegan Fonseca    Exercises  Long Sitting Quad Set - 2-3 x daily - 7 x weekly - 1 sets - 10 reps - 10 hold  Long Sitting Calf Stretch with Strap - 2-3 x daily - 7 x weekly - 5 reps - 30 sec hold  Seated Table Hamstring Stretch - 2-3 x daily - 7 x weekly - 5 reps - 30 sec hold  Supine Straight Leg Raises - 2-3 x daily - 7 x weekly - 3 sets - 10 reps  Side Leg Lifts - 2-3 x daily - 7 x weekly - 3 sets - 10 reps      Therapeutic Exercise and NMR EXR  [x] (95842) Provided verbal/tactile cueing for activities related to strengthening, flexibility, endurance, ROM for improvements in LE, proximal hip, and core control with self care, mobility, lifting, ambulation.  [] (02887) Provided verbal/tactile cueing for activities related to improving balance, coordination, kinesthetic sense, posture, motor skill, proprioception  to assist with LE, proximal hip, and core control in self care, mobility, lifting, ambulation and eccentric single leg control.      NMR and Therapeutic Activities:    [] (24784 or 92553) Provided verbal/tactile cueing for activities related to improving balance, coordination, kinesthetic sense, posture, motor skill, proprioception and motor activation to allow for proper function of core, proximal hip and LE with self care and ADLs  [] (41497) Gait Re-education- Provided training and instruction to the patient for proper LE, core and proximal hip recruitment and positioning and eccentric body weight control with ambulation re-education including up and down stairs     Home Exercise Program:    [x] (58621) Reviewed/Progressed HEP activities related to strengthening, flexibility, endurance, ROM of core, proximal hip and LE for functional self-care, mobility, lifting and ambulation/stair navigation   [] (33731)Reviewed/Progressed HEP activities related to improving balance, coordination, kinesthetic sense, posture, motor skill, proprioception of core, proximal hip and LE for self care, mobility, lifting, and ambulation/stair navigation      Manual Treatments:  PROM / STM / Oscillations-Mobs:  G-I, II, III, IV (PA's, Inf., Post.)  [] (98532) Provided manual therapy to mobilize LE, proximal hip and/or LS spine soft tissue/joints for the purpose of modulating pain, promoting relaxation,  increasing ROM, reducing/eliminating soft tissue swelling/inflammation/restriction, improving soft tissue extensibility and allowing for proper ROM for normal function with self care, mobility, lifting and ambulation. Modalities:   Vaso x 15 min    Charges:  Timed Code Treatment Minutes: 25   Total Treatment Minutes: 60       [x] EVAL (LOW) 07341 (typically 20 minutes face-to-face)  [] EVAL (MOD) 95457 (typically 30 minutes face-to-face)  [] EVAL (HIGH) 13331 (typically 45 minutes face-to-face)  [] RE-EVAL   [x] KD(41121) x  2   [] IONTO  [] NMR (85197) x     [x] VASO  [] Manual (25685) x      [] Other:  [] TA x      [] Mech Traction (32908)  [] ES(attended) (27644)      [] ES (un) (88492):       GOALS:   Patient stated goal: strengthen knee. Flexibility. []? Progressing: []? Met: []? Not Met: []? Adjusted    Therapist goals for Patient:   Short Term Goals: To be achieved in: 2 weeks  1. Independent in HEP and progression per patient tolerance, in order to prevent re-injury. []? Progressing: []? Met: []? Not Met: []? Adjusted   2. Patient will have a decrease in pain to facilitate improvement in movement, function, and ADLs as indicated by Functional Deficits. []? Progressing: []? Met: []? Not Met: []? Adjusted     Long Term Goals:  To be achieved in: 12 weeks  1. Disability index score of 30% or less for the LEFS to assist with reaching prior level of function. []? Progressing: []? Met: []? Not Met: []? Adjusted  2. Patient will demonstrate increased [facility]OM to [facility] to allow for proper joint functioning as indicated by patients Functional Deficits. []? Progressing: []? Met: []? Not Met: []? Adjusted  3. Patient will demonstrate an increase in Strength to good proximal hip strength and control, within 5lb HHD in LE to allow for proper functional mobility as indicated by patients Functional Deficits. []? Progressing: []? Met: []? Not Met: []? Adjusted  4. Patient will return to ADL and household functional activities without increased symptoms or restriction. []? Progressing: []? Met: []? Not Met: []? Adjusted  5. Pt will return to work and recreational activities. []? Progressing: []? Met: []? Not Met: []? Adjusted       Overall Progression Towards Functional goals/ Treatment Progress Update:  [] Patient is progressing as expected towards functional goals listed. [] Progression is slowed due to complexities/Impairments listed. [] Progression has been slowed due to co-morbidities.   [x] Plan just implemented, too soon to assess goals progression <30days   [] Goals require adjustment due to lack of progress  [] Patient is not progressing as expected and requires additional follow up with physician  [] Other    Prognosis for POC: [x] Good [] Fair  [] Poor      Patient requires continued skilled intervention: [x] Yes  [] No    Treatment/Activity Tolerance:  [x] Patient able to complete treatment  [x] Patient limited by fatigue  [x] Patient limited by pain     [] Patient limited by other medical complications  [] Other:         PLAN: See eval  [] Continue per plan of care [] Alter current plan (see comments above)  [x] Plan of care initiated [] Hold pending MD visit [] Discharge      Electronically signed by:  Toy Apgar, PT    Note: If patient does not return for scheduled/ recommended follow up visits, this note will serve as a discharge from care along with most recent update on progress.

## 2021-08-20 ENCOUNTER — HOSPITAL ENCOUNTER (OUTPATIENT)
Dept: PHYSICAL THERAPY | Age: 51
Setting detail: THERAPIES SERIES
Discharge: HOME | End: 2021-08-20
Payer: COMMERCIAL

## 2021-08-20 PROCEDURE — 97110 THERAPEUTIC EXERCISES: CPT | Performed by: PHYSICAL THERAPIST

## 2021-08-20 PROCEDURE — 97530 THERAPEUTIC ACTIVITIES: CPT | Performed by: PHYSICAL THERAPIST

## 2021-08-20 PROCEDURE — 97016 VASOPNEUMATIC DEVICE THERAPY: CPT | Performed by: PHYSICAL THERAPIST

## 2021-08-20 NOTE — FLOWSHEET NOTE
[facility] [address]    Physical Therapy Daily Treatment Note  Date:  2021    Patient Name:  Leah Fernandez    :  1970  MRN: [id number]  Restrictions/Precautions:    Medical/Treatment Diagnosis Information:  · Diagnosis: s/p Right knee partial knee replacement    · DOS: 21          ICD10: M17.11  · Treatment Diagnosis: increased swelling; increased pain; decreased strength; gait abnormality  Insurance/Certification information:  PT Insurance Information: [facility]  Physician Information:  Referring Practitioner: Rubens Jaime MD  Has the plan of care been signed (Y/N):        []  Yes  [x]  No     Date of Patient follow up with Physician:       Is this a Progress Report:     []  Yes  [x]  No        If Yes:  Date Range for reporting period:  Beginning 21  Ending     Progress report will be due (10 Rx or 30 days whichever is less):       Recertification will be due (POC Duration  / 90 days whichever is less): 21        Visit # Insurance Allowable Auth Required   2  []  Yes []  No        Functional Scale: LEFS= 28%   Date assessed: 21      Latex Allergy:  [x]NO      []YES  Preferred Language for Healthcare:   [x]English       []other:     Pain level:       SUBJECTIVE:  Pt states he is feeling good today.      OBJECTIVE:    Observation: Quad/VMO= fair/fair-   Test measurements:      RESTRICTIONS/PRECAUTIONS:  Right knee partial knee replacement    Exercises/Interventions:   Exercise/Equipment Resistance/Repetitions Other comments   Stretching     Hamstring [id number]    Towel Pull [id number]    Inclined Calf     Hip Flexion     ITB     Groin     Quad                    SLR     Supine 0# 3x10    Abduction 0# 3x10    Adduction Foam roll [id number]    Prone     SLR+          Isometrics     Quad sets 90q74wro         Patellar Glides     Medial     Superior     Inferior          ROM     Sheet Pulls     Hang Weights     Passive     Active Weight Shift     Ankle Pumps     ERMI  130 degrees last visit             CKC     Calf raises 3x10    Wall sits     Step ups     1 leg stand     Squatting     CC TKE Blue 3x10     Balance     bridges          PRE     Extension  RANGE:   Flexion  RANGE:        Quantum machines     Leg press      Leg extension     Leg curl          Manual interventions                 Patient Education:  Access Code: [email or web address]  URL: [email or web address]  Date: 08/20/2021  Prepared by: Marifer Albertst    Exercises  Long Sitting Quad Set - 2-3 x daily - 7 x weekly - 1 sets - 10 reps - 10 hold  Long Sitting Calf Stretch with Strap - 2-3 x daily - 7 x weekly - 5 reps - 30 sec hold  Seated Table Hamstring Stretch - 2-3 x daily - 7 x weekly - 5 reps - 30 sec hold  Supine Straight Leg Raises - 2-3 x daily - 7 x weekly - 3 sets - 10 reps  Side Leg Lifts - 2-3 x daily - 7 x weekly - 3 sets - 10 reps  Supine Hip Adduction Isometric with Ball - 2-3 x daily - 7 x weekly - 1 sets - 10 reps - 10 sec hold  Standing Heel Raise - 2-3 x daily - 7 x weekly - 3 sets - 10 reps  Standing Terminal Knee Extension with Resistance - 2-3 x daily - 7 x weekly - 3 sets - 10 reps      Therapeutic Exercise and NMR EXR  [x] (27765) Provided verbal/tactile cueing for activities related to strengthening, flexibility, endurance, ROM for improvements in LE, proximal hip, and core control with self care, mobility, lifting, ambulation.  [] (31583) Provided verbal/tactile cueing for activities related to improving balance, coordination, kinesthetic sense, posture, motor skill, proprioception  to assist with LE, proximal hip, and core control in self care, mobility, lifting, ambulation and eccentric single leg control.      NMR and Therapeutic Activities:    [x] (26167 or 48195) Provided verbal/tactile cueing for activities related to improving balance, coordination, kinesthetic sense, posture, motor skill, proprioception and motor activation to allow for proper function of core, proximal hip and LE with self care and ADLs  [] (35363) Gait Re-education- Provided training and instruction to the patient for proper LE, core and proximal hip recruitment and positioning and eccentric body weight control with ambulation re-education including up and down stairs     Home Exercise Program:    [x] (85956) Reviewed/Progressed HEP activities related to strengthening, flexibility, endurance, ROM of core, proximal hip and LE for functional self-care, mobility, lifting and ambulation/stair navigation   [x] (37290)Reviewed/Progressed HEP activities related to improving balance, coordination, kinesthetic sense, posture, motor skill, proprioception of core, proximal hip and LE for self care, mobility, lifting, and ambulation/stair navigation      Manual Treatments:  PROM / STM / Oscillations-Mobs:  G-I, II, III, IV (PA's, Inf., Post.)  [] (97792) Provided manual therapy to mobilize LE, proximal hip and/or LS spine soft tissue/joints for the purpose of modulating pain, promoting relaxation,  increasing ROM, reducing/eliminating soft tissue swelling/inflammation/restriction, improving soft tissue extensibility and allowing for proper ROM for normal function with self care, mobility, lifting and ambulation. Modalities:   Vaso x 15 min    Charges:  Timed Code Treatment Minutes: 40   Total Treatment Minutes: 55       [] EVAL (LOW) 93778 (typically 20 minutes face-to-face)  [] EVAL (MOD) 07799 (typically 30 minutes face-to-face)  [] EVAL (HIGH) 47904 (typically 45 minutes face-to-face)  [] RE-EVAL   [x] RD(26855) x  2   [] IONTO  [] NMR (38003) x     [x] VASO  [] Manual (20965) x      [] Other:  [x] TA x   1   [] Mech Traction (26084)  [] ES(attended) (39356)      [] ES (un) (88846):       GOALS:   Patient stated goal: strengthen knee. Flexibility. []? Progressing: []? Met: []? Not Met: []? Adjusted    Therapist goals for Patient:   Short Term Goals: To be achieved in: 2 weeks  1. Independent in HEP and progression per patient tolerance, in order to prevent re-injury. []? Progressing: []? Met: []? Not Met: []? Adjusted   2. Patient will have a decrease in pain to facilitate improvement in movement, function, and ADLs as indicated by Functional Deficits. []? Progressing: []? Met: []? Not Met: []? Adjusted     Long Term Goals: To be achieved in: 12 weeks  1. Disability index score of 30% or less for the LEFS to assist with reaching prior level of function. []? Progressing: []? Met: []? Not Met: []? Adjusted  2. Patient will demonstrate increased AROM to [facility] to allow for proper joint functioning as indicated by patients Functional Deficits. []? Progressing: []? Met: []? Not Met: []? Adjusted  3. Patient will demonstrate an increase in Strength to good proximal hip strength and control, within 5lb HHD in LE to allow for proper functional mobility as indicated by patients Functional Deficits. []? Progressing: []? Met: []? Not Met: []? Adjusted  4. Patient will return to ADL and household functional activities without increased symptoms or restriction. []? Progressing: []? Met: []? Not Met: []? Adjusted  5. Pt will return to work and recreational activities. []? Progressing: []? Met: []? Not Met: []? Adjusted       Overall Progression Towards Functional goals/ Treatment Progress Update:  [] Patient is progressing as expected towards functional goals listed. [] Progression is slowed due to complexities/Impairments listed. [] Progression has been slowed due to co-morbidities.   [x] Plan just implemented, too soon to assess goals progression <30days   [] Goals require adjustment due to lack of progress  [] Patient is not progressing as expected and requires additional follow up with physician  [] Other    Prognosis for POC: [x] Good [] Fair  [] Poor      Patient requires continued skilled intervention: [x] Yes  [] No    Treatment/Activity Tolerance:  [x] Patient able to complete treatment  [x] Patient limited by fatigue  [x] Patient limited by pain     [] Patient limited by other medical complications  [x] Other: Pt is progressing very well. He has good ROM. His quad set is improving, and therefore, his knee extension is increasing as well. Pt tolerated the new exercises well. PLAN: Stretching, strengthening, balance program.   [x] Continue per plan of care [] Alter current plan (see comments above)  [] Plan of care initiated [] Hold pending MD visit [] Discharge      Electronically signed by:  Ysabel Saha PT    Note: If patient does not return for scheduled/ recommended follow up visits, this note will serve as a discharge from care along with most recent update on progress.

## 2021-08-24 ENCOUNTER — HOSPITAL ENCOUNTER (OUTPATIENT)
Dept: PHYSICAL THERAPY | Age: 51
Setting detail: THERAPIES SERIES
Discharge: HOME | End: 2021-08-24
Payer: COMMERCIAL

## 2021-08-24 NOTE — FLOWSHEET NOTE
[facility] [address] [facility] [address]    Physical Therapy  Cancellation/No-show Note  Patient Name:  Leim Kawasaki  :  1970   Date:  2021  Cancelled visits to date: 0  No-shows to date: 0    For today's appointment patient:  [x]  Cancelled  []  Rescheduled appointment  []  No-show     Reason given by patient:  []  Patient ill  []  Conflicting appointment   []  No transportation    []  Conflict with work  [x]  No reason given  []  Other:     Comments:      Electronically signed by:  Price Evangelista PT, PT

## 2021-08-26 ENCOUNTER — HOSPITAL ENCOUNTER (OUTPATIENT)
Dept: PHYSICAL THERAPY | Age: 51
Setting detail: THERAPIES SERIES
Discharge: HOME | End: 2021-08-26
Payer: COMMERCIAL

## 2021-08-26 PROCEDURE — 97530 THERAPEUTIC ACTIVITIES: CPT | Performed by: PHYSICAL THERAPIST

## 2021-08-26 PROCEDURE — 97016 VASOPNEUMATIC DEVICE THERAPY: CPT | Performed by: PHYSICAL THERAPIST

## 2021-08-26 PROCEDURE — 97110 THERAPEUTIC EXERCISES: CPT | Performed by: PHYSICAL THERAPIST

## 2021-08-26 NOTE — FLOWSHEET NOTE
[facility]    Physical Therapy Daily Treatment Note  Date:  2021    Patient Name:  José Manuel Adam    :  1970  MRN: [id number]  Restrictions/Precautions:    Medical/Treatment Diagnosis Information:  · Diagnosis: s/p Right knee partial knee replacement    · DOS: 21          ICD10: M17.11  · Treatment Diagnosis: increased swelling; increased pain; decreased strength; gait abnormality  Insurance/Certification information:  PT Insurance Information: [employer]  Physician Information:  Referring Practitioner: Leeanna Caldwell MD  Has the plan of care been signed (Y/N):        []  Yes  [x]  No     Date of Patient follow up with Physician: 21      Is this a Progress Report:     []  Yes  [x]  No        If Yes:  Date Range for reporting period:  Beginning 21  Ending     Progress report will be due (10 Rx or 30 days whichever is less):       Recertification will be due (POC Duration  / 90 days whichever is less): 21        Visit # Insurance Allowable Auth Required   3(12 total) 20 []  Yes []  No        Functional Scale: LEFS= 28%   Date assessed: 21      Latex Allergy:  [x]NO      []YES  Preferred Language for Healthcare:   [x]English       []other:     Pain level:       SUBJECTIVE:  2 weeks post-op. Pt had an increase in PF pain. He called the MD, and they suggested rest and ice until PT appt today. Pt reports that it is feeling much better.      OBJECTIVE:    Observation: Quad/VMO= fair/fair-; PF pain with quad set   Significant HS tightness   Fair patellar mobility   Test measurements:  130 degrees ERMI    RESTRICTIONS/PRECAUTIONS:  Right knee partial knee replacement    Exercises/Interventions:   Exercise/Equipment Resistance/Repetitions Other comments   Stretching     Hamstring [id number]    Towel Pull [id number]    Inclined Calf     Hip Flexion     ITB     Groin     Quad                    SLR     Supine     Abduction 0# 3x10 Adduction Foam roll [id number]    Prone 3x10     SLR+          Isometrics     Quad sets [id number]         Patellar Glides     Medial     Superior     Inferior          ROM     Sheet Pulls     Hang Weights     Passive     Active Supine heel dynhy=204 degrees    Weight Shift     Ankle Pumps     ERMI               CKC     Calf raises 3x10    Wall sits     Step ups     1 leg stand     Squatting     CC TKE  PF pain   Balance     bridges          PRE     Extension  RANGE:   Flexion  RANGE:        Quantum machines     Leg press      Leg extension     Leg curl          Manual interventions                 Patient Education:  Access Code: [email or web address]  URL: [email or web address]  Date: 08/20/2021  Prepared by: Lauren Miranda    Exercises  Long Sitting Quad Set - 2-3 x daily - 7 x weekly - 1 sets - 10 reps - 10 hold  Long Sitting Calf Stretch with Strap - 2-3 x daily - 7 x weekly - 5 reps - 30 sec hold  Seated Table Hamstring Stretch - 2-3 x daily - 7 x weekly - 5 reps - 30 sec hold  Supine Straight Leg Raises - 2-3 x daily - 7 x weekly - 3 sets - 10 reps  Side Leg Lifts - 2-3 x daily - 7 x weekly - 3 sets - 10 reps  Supine Hip Adduction Isometric with Ball - 2-3 x daily - 7 x weekly - 1 sets - 10 reps - 10 sec hold  Standing Heel Raise - 2-3 x daily - 7 x weekly - 3 sets - 10 reps  Standing Terminal Knee Extension with Resistance - 2-3 x daily - 7 x weekly - 3 sets - 10 reps      Therapeutic Exercise and NMR EXR  [x] (29575) Provided verbal/tactile cueing for activities related to strengthening, flexibility, endurance, ROM for improvements in LE, proximal hip, and core control with self care, mobility, lifting, ambulation.  [] (39400) Provided verbal/tactile cueing for activities related to improving balance, coordination, kinesthetic sense, posture, motor skill, proprioception  to assist with LE, proximal hip, and core control in self care, mobility, lifting, ambulation and eccentric single leg control.      NMR and Therapeutic Activities:    [x] (91657 or 97429) Provided verbal/tactile cueing for activities related to improving balance, coordination, kinesthetic sense, posture, motor skill, proprioception and motor activation to allow for proper function of core, proximal hip and LE with self care and ADLs  [] (92544) Gait Re-education- Provided training and instruction to the patient for proper LE, core and proximal hip recruitment and positioning and eccentric body weight control with ambulation re-education including up and down stairs     Home Exercise Program:    [x] (83245) Reviewed/Progressed HEP activities related to strengthening, flexibility, endurance, ROM of core, proximal hip and LE for functional self-care, mobility, lifting and ambulation/stair navigation   [x] (83242)Reviewed/Progressed HEP activities related to improving balance, coordination, kinesthetic sense, posture, motor skill, proprioception of core, proximal hip and LE for self care, mobility, lifting, and ambulation/stair navigation      Manual Treatments:  PROM / STM / Oscillations-Mobs:  G-I, II, III, IV (PA's, Inf., Post.)  [] (83778) Provided manual therapy to mobilize LE, proximal hip and/or LS spine soft tissue/joints for the purpose of modulating pain, promoting relaxation,  increasing ROM, reducing/eliminating soft tissue swelling/inflammation/restriction, improving soft tissue extensibility and allowing for proper ROM for normal function with self care, mobility, lifting and ambulation.      Modalities:   Vaso x 15 min    Charges:  Timed Code Treatment Minutes: 40   Total Treatment Minutes: 55       [] EVAL (LOW) 10886 (typically 20 minutes face-to-face)  [] EVAL (MOD) 58229 (typically 30 minutes face-to-face)  [] EVAL (HIGH) 43369 (typically 45 minutes face-to-face)  [] RE-EVAL   [x] QA(19731) x  2   [] IONTO  [] NMR (17691) x     [x] VASO  [] Manual (39037) x      [] Other:  [x] TA x   1   [] Mech Traction (27125)  [] ES(attended) (41846) [] ES (un) (71175):       GOALS:   Patient stated goal: strengthen knee. Flexibility. []? Progressing: []? Met: []? Not Met: []? Adjusted    Therapist goals for Patient:   Short Term Goals: To be achieved in: 2 weeks  1. Independent in HEP and progression per patient tolerance, in order to prevent re-injury. []? Progressing: []? Met: []? Not Met: []? Adjusted   2. Patient will have a decrease in pain to facilitate improvement in movement, function, and ADLs as indicated by Functional Deficits. []? Progressing: []? Met: []? Not Met: []? Adjusted     Long Term Goals: To be achieved in: 12 weeks  1. Disability index score of 30% or less for the LEFS to assist with reaching prior level of function. []? Progressing: []? Met: []? Not Met: []? Adjusted  2. Patient will demonstrate increased AROM to [facility] to allow for proper joint functioning as indicated by patients Functional Deficits. []? Progressing: []? Met: []? Not Met: []? Adjusted  3. Patient will demonstrate an increase in Strength to good proximal hip strength and control, within 5lb HHD in LE to allow for proper functional mobility as indicated by patients Functional Deficits. []? Progressing: []? Met: []? Not Met: []? Adjusted  4. Patient will return to ADL and household functional activities without increased symptoms or restriction. []? Progressing: []? Met: []? Not Met: []? Adjusted  5. Pt will return to work and recreational activities. []? Progressing: []? Met: []? Not Met: []? Adjusted       Overall Progression Towards Functional goals/ Treatment Progress Update:  [] Patient is progressing as expected towards functional goals listed. [] Progression is slowed due to complexities/Impairments listed. [] Progression has been slowed due to co-morbidities.   [x] Plan just implemented, too soon to assess goals progression <30days   [] Goals require adjustment due to lack of progress  [] Patient is not progressing as expected and requires additional follow up with physician  [] Other    Prognosis for POC: [x] Good [] Fair  [] Poor      Patient requires continued skilled intervention: [x] Yes  [] No    Treatment/Activity Tolerance:  [x] Patient able to complete treatment  [x] Patient limited by fatigue  [x] Patient limited by pain     [] Patient limited by other medical complications  [x] Other: Pt has PF pain which has decreased with rest and ice over the past 2-3 days. Modified the program today. Pt has pain with QS and SLR. Hold TKE today. Good knee flexion ROM. PLAN: Stretching, strengthening, balance program. PF protection. [x] Continue per plan of care [] Alter current plan (see comments above)  [] Plan of care initiated [] Hold pending MD visit [] Discharge      Electronically signed by:  Lauren Miranda PT    Note: If patient does not return for scheduled/ recommended follow up visits, this note will serve as a discharge from care along with most recent update on progress.

## 2021-09-01 ENCOUNTER — HOSPITAL ENCOUNTER (OUTPATIENT)
Dept: PHYSICAL THERAPY | Age: 51
Setting detail: THERAPIES SERIES
Discharge: HOME | End: 2021-09-01
Payer: COMMERCIAL

## 2021-09-01 PROCEDURE — 97530 THERAPEUTIC ACTIVITIES: CPT | Performed by: PHYSICAL THERAPIST

## 2021-09-01 PROCEDURE — 97110 THERAPEUTIC EXERCISES: CPT | Performed by: PHYSICAL THERAPIST

## 2021-09-01 PROCEDURE — 97016 VASOPNEUMATIC DEVICE THERAPY: CPT | Performed by: PHYSICAL THERAPIST

## 2021-09-01 NOTE — FLOWSHEET NOTE
[facility]    Physical Therapy Daily Treatment Note  Date:  2021    Patient Name:  Thuy Ramos    :  1970  MRN: [id number]  Restrictions/Precautions:    Medical/Treatment Diagnosis Information:  · Diagnosis: s/p Right knee partial knee replacement    · DOS: 21          ICD10: M17.11  · Treatment Diagnosis: increased swelling; increased pain; decreased strength; gait abnormality  Insurance/Certification information:  PT Insurance Information: [facility]  Physician Information:  Referring Practitioner: Gaston Singh MD  Has the plan of care been signed (Y/N):        []  Yes  [x]  No     Date of Patient follow up with Physician:       Is this a Progress Report:     []  Yes  [x]  No        If Yes:  Date Range for reporting period:  Beginning 21  Ending     Progress report will be due (10 Rx or 30 days whichever is less):       Recertification will be due (POC Duration  / 90 days whichever is less): 21        Visit # Insurance Allowable Auth Required   4(13 total) 20 []  Yes []  No        Functional Scale: LEFS= 28%   Date assessed: 21      Latex Allergy:  [x]NO      []YES  Preferred Language for Healthcare:   [x]English       []other:     Pain level:  Intermittent pain     SUBJECTIVE:  3 weeks post-op. Pt has no new c/o's today.      OBJECTIVE:    Observation: Quad/VMO= fair/fair-   Significant HS tightness   Mild swelling   Fair patellar mobility   Test measurements:    Knee extension=  0 degrees   Knee flexion=     RESTRICTIONS/PRECAUTIONS:  Right knee partial knee replacement    Exercises/Interventions:  Right knee  Exercise/Equipment Resistance/Repetitions Other comments   Stretching     Hamstring [id number]    Towel Pull [id number]    Inclined Calf Next visit    Hip Flexion     ITB     Groin     Quad                    SLR     Supine 0# 3x10    Abduction 2# 3x10    Adduction     Prone 3x10     SLR+          Isometrics Quad sets          Patellar Glides     Medial     Superior     Inferior          ROM     Sheet Pulls     Hang Weights     Passive     Active     Weight Shift     Ankle Pumps     ERMI               CKC     Calf raises 3x10    Wall sits [id number]    Step ups     1 leg stand     Squatting     CC TKE  PF pain   Balance PS L8 x 3 min    bridges          PRE     Extension 4 weeks RANGE:   Flexion Standing HS curl 3x10  RANGE:        Quantum machines     Leg press      Leg extension     Leg curl          Manual interventions                 Patient Education:   Access Code: [email or web address]  URL: [email or web address]  Date: 09/01/2021  Prepared by: Scott Pacheco    Exercises  Long Sitting Quad Set - 2 x daily - 7 x weekly - 1 sets - 10 reps - 10 hold  Long Sitting Calf Stretch with Strap - 2 x daily - 7 x weekly - 5 reps - 30 sec hold  Seated Table Hamstring Stretch - 2 x daily - 7 x weekly - 5 reps - 30 sec hold  Supine Straight Leg Raises - 2 x daily - 7 x weekly - 3 sets - 10 reps  Side Leg Lifts - 2 x daily - 7 x weekly - 3 sets - 10 reps  Supine Hip Adduction Isometric with Ball - 2 x daily - 7 x weekly - 1 sets - 10 reps - 10 sec hold  Standing Heel Raise - 2 x daily - 7 x weekly - 3 sets - 10 reps  Wall Quarter Squat - 2 x daily - 7 x weekly - 3 reps - 30 sec hold  Standing Knee Flexion AROM with Chair Support - 2 x daily - 7 x weekly - 3 sets - 10 reps  Prone Hip Extension - 2 x daily - 7 x weekly - 3 sets - 10 reps      Therapeutic Exercise and NMR EXR  [x] (97359) Provided verbal/tactile cueing for activities related to strengthening, flexibility, endurance, ROM for improvements in LE, proximal hip, and core control with self care, mobility, lifting, ambulation.  [] (96509) Provided verbal/tactile cueing for activities related to improving balance, coordination, kinesthetic sense, posture, motor skill, proprioception  to assist with LE, proximal hip, and core control in self care, mobility, lifting, ambulation and eccentric single leg control. NMR and Therapeutic Activities:    [x] (29278 or 47330) Provided verbal/tactile cueing for activities related to improving balance, coordination, kinesthetic sense, posture, motor skill, proprioception and motor activation to allow for proper function of core, proximal hip and LE with self care and ADLs  [] (20550) Gait Re-education- Provided training and instruction to the patient for proper LE, core and proximal hip recruitment and positioning and eccentric body weight control with ambulation re-education including up and down stairs     Home Exercise Program:    [x] (75020) Reviewed/Progressed HEP activities related to strengthening, flexibility, endurance, ROM of core, proximal hip and LE for functional self-care, mobility, lifting and ambulation/stair navigation   [x] (24648)Reviewed/Progressed HEP activities related to improving balance, coordination, kinesthetic sense, posture, motor skill, proprioception of core, proximal hip and LE for self care, mobility, lifting, and ambulation/stair navigation      Manual Treatments:  PROM / STM / Oscillations-Mobs:  G-I, II, III, IV (PA's, Inf., Post.)  [] (67680) Provided manual therapy to mobilize LE, proximal hip and/or LS spine soft tissue/joints for the purpose of modulating pain, promoting relaxation,  increasing ROM, reducing/eliminating soft tissue swelling/inflammation/restriction, improving soft tissue extensibility and allowing for proper ROM for normal function with self care, mobility, lifting and ambulation.      Modalities:   Vaso x 15 min    Charges:  Timed Code Treatment Minutes: 45   Total Treatment Minutes: 60       [] EVAL (LOW) 92318 (typically 20 minutes face-to-face)  [] EVAL (MOD) 45321 (typically 30 minutes face-to-face)  [] EVAL (HIGH) 46141 (typically 45 minutes face-to-face)  [] RE-EVAL   [x] EN(47507) x  2   [] IONTO  [] NMR (27053) x     [x] VASO  [] Manual (79087) x      [] Other:  [x] TA x   1   [] [facility] Traction (48531)  [] ES(attended) (11729)      [] ES (un) (83693):       GOALS:   Patient stated goal: strengthen knee. Flexibility. []? Progressing: []? Met: []? Not Met: []? Adjusted    Therapist goals for Patient:   Short Term Goals: To be achieved in: 2 weeks  1. Independent in HEP and progression per patient tolerance, in order to prevent re-injury. []? Progressing: []? Met: []? Not Met: []? Adjusted   2. Patient will have a decrease in pain to facilitate improvement in movement, function, and ADLs as indicated by Functional Deficits. []? Progressing: []? Met: []? Not Met: []? Adjusted     Long Term Goals: To be achieved in: 12 weeks  1. Disability index score of 30% or less for the LEFS to assist with reaching prior level of function. []? Progressing: []? Met: []? Not Met: []? Adjusted  2. Patient will demonstrate increased AROM to [facility] to allow for proper joint functioning as indicated by patients Functional Deficits. []? Progressing: []? Met: []? Not Met: []? Adjusted  3. Patient will demonstrate an increase in Strength to good proximal hip strength and control, within 5lb HHD in LE to allow for proper functional mobility as indicated by patients Functional Deficits. []? Progressing: []? Met: []? Not Met: []? Adjusted  4. Patient will return to ADL and household functional activities without increased symptoms or restriction. []? Progressing: []? Met: []? Not Met: []? Adjusted  5. Pt will return to work and recreational activities. []? Progressing: []? Met: []? Not Met: []? Adjusted       Overall Progression Towards Functional goals/ Treatment Progress Update:  [] Patient is progressing as expected towards functional goals listed. [] Progression is slowed due to complexities/Impairments listed. [] Progression has been slowed due to co-morbidities.   [x] Plan just implemented, too soon to assess goals progression <30days   [] Goals require adjustment due to lack of progress  [] Patient is not progressing as expected and requires additional follow up with physician  [] Other    Prognosis for POC: [x] Good [] Fair  [] Poor      Patient requires continued skilled intervention: [x] Yes  [] No    Treatment/Activity Tolerance:  [x] Patient able to complete treatment  [x] Patient limited by fatigue  [] Patient limited by pain     [] Patient limited by other medical complications  [x] Other:  Pt tolerated the increase in exercises well today. PLAN: Stretching, strengthening, balance program. PF protection. [x] Continue per plan of care [] Alter current plan (see comments above)  [] Plan of care initiated [] Hold pending MD visit [] Discharge      Electronically signed by:  Marguerite Navarro PT    Note: If patient does not return for scheduled/ recommended follow up visits, this note will serve as a discharge from care along with most recent update on progress.

## 2021-09-03 ENCOUNTER — HOSPITAL ENCOUNTER (OUTPATIENT)
Dept: PHYSICAL THERAPY | Age: 51
Setting detail: THERAPIES SERIES
Discharge: HOME | End: 2021-09-03
Payer: COMMERCIAL

## 2021-09-03 PROCEDURE — 97110 THERAPEUTIC EXERCISES: CPT | Performed by: PHYSICAL THERAPIST

## 2021-09-03 PROCEDURE — 97530 THERAPEUTIC ACTIVITIES: CPT | Performed by: PHYSICAL THERAPIST

## 2021-09-03 PROCEDURE — 97016 VASOPNEUMATIC DEVICE THERAPY: CPT | Performed by: PHYSICAL THERAPIST

## 2021-09-03 NOTE — FLOWSHEET NOTE
[facility]    Physical Therapy Daily Treatment Note  Date:  9/3/2021    Patient Name:  Terri Carrillo    :  1970  MRN: [id number]  Restrictions/Precautions:    Medical/Treatment Diagnosis Information:  · Diagnosis: s/p Right knee partial knee replacement    · DOS: 21          ICD10: M17.11  · Treatment Diagnosis: increased swelling; increased pain; decreased strength; gait abnormality  Insurance/Certification information:  PT Insurance Information: [facility]  Physician Information:  Referring Practitioner: Geno Pierce MD  Has the plan of care been signed (Y/N):        []  Yes  [x]  No     Date of Patient follow up with Physician:       Is this a Progress Report:     []  Yes  [x]  No        If Yes:  Date Range for reporting period:  Beginning 21  Ending     Progress report will be due (10 Rx or 30 days whichever is less):       Recertification will be due (POC Duration  / 90 days whichever is less): 21        Visit # Insurance Allowable Auth Required   5(14 total) 20 []  Yes []  No        Functional Scale: LEFS= 28%   Date assessed: 21      Latex Allergy:  [x]NO      []YES  Preferred Language for Healthcare:   [x]English       []other:     Pain level:  Intermittent pain     SUBJECTIVE:  3 weeks post-op. Pt has no new c/o's today.      OBJECTIVE:    Observation: Quad/VMO= fair/fair-   Significant HS tightness   Mild swelling   Fair patellar mobility   Test measurements:    Knee extension=  -3 degrees   Knee flexion= 135 degrees sheet pull    RESTRICTIONS/PRECAUTIONS:  Right knee partial knee replacement    Exercises/Interventions:  Right knee  Exercise/Equipment Resistance/Repetitions Other comments   Stretching     Hamstring [id number]    Towel Pull     Inclined Calf [id number]    Hip Flexion     ITB     Groin     Quad                    SLR     Supine 2# 3x10 PF discomfort; knee extension lag   Abduction 2# 3x10 Adduction     Prone 3x10     SLR+          Isometrics     Quad sets          Patellar Glides     Medial     Superior     Inferior          ROM     Sheet Pulls [id number]    Hang Weights     Passive     Active     Weight Shift     Ankle Pumps     ERMI               CKC     Calf raises 3x10    Wall sits [id number]    Step ups     1 leg stand     Squatting     CC TKE  PF pain   Balance PS L8 x 3 min    bridges          PRE     Extension 4 weeks RANGE:   Flexion Standing HS curl 3x10  RANGE:        Quantum machines     Leg press      Leg extension     Leg curl          Manual interventions                 Patient Education:   Access Code: [email or web address]  URL: [email or web address]  Date: 09/01/2021  Prepared by: Mumtaz Emmanuel    Exercises  Long Sitting Quad Set - 2 x daily - 7 x weekly - 1 sets - 10 reps - 10 hold  Long Sitting Calf Stretch with Strap - 2 x daily - 7 x weekly - 5 reps - 30 sec hold  Seated Table Hamstring Stretch - 2 x daily - 7 x weekly - 5 reps - 30 sec hold  Supine Straight Leg Raises - 2 x daily - 7 x weekly - 3 sets - 10 reps  Side Leg Lifts - 2 x daily - 7 x weekly - 3 sets - 10 reps  Supine Hip Adduction Isometric with Ball - 2 x daily - 7 x weekly - 1 sets - 10 reps - 10 sec hold  Standing Heel Raise - 2 x daily - 7 x weekly - 3 sets - 10 reps  Wall Quarter Squat - 2 x daily - 7 x weekly - 3 reps - 30 sec hold  Standing Knee Flexion AROM with Chair Support - 2 x daily - 7 x weekly - 3 sets - 10 reps  Prone Hip Extension - 2 x daily - 7 x weekly - 3 sets - 10 reps      Therapeutic Exercise and NMR EXR  [x] (42441) Provided verbal/tactile cueing for activities related to strengthening, flexibility, endurance, ROM for improvements in LE, proximal hip, and core control with self care, mobility, lifting, ambulation.  [] (34953) Provided verbal/tactile cueing for activities related to improving balance, coordination, kinesthetic sense, posture, motor skill, proprioception  to assist with LE, proximal hip, and core control in self care, mobility, lifting, ambulation and eccentric single leg control. NMR and Therapeutic Activities:    [x] (77233 or 11453) Provided verbal/tactile cueing for activities related to improving balance, coordination, kinesthetic sense, posture, motor skill, proprioception and motor activation to allow for proper function of core, proximal hip and LE with self care and ADLs  [] (79751) Gait Re-education- Provided training and instruction to the patient for proper LE, core and proximal hip recruitment and positioning and eccentric body weight control with ambulation re-education including up and down stairs     Home Exercise Program:    [x] (60280) Reviewed/Progressed HEP activities related to strengthening, flexibility, endurance, ROM of core, proximal hip and LE for functional self-care, mobility, lifting and ambulation/stair navigation   [x] (62014)Reviewed/Progressed HEP activities related to improving balance, coordination, kinesthetic sense, posture, motor skill, proprioception of core, proximal hip and LE for self care, mobility, lifting, and ambulation/stair navigation      Manual Treatments:  PROM / STM / Oscillations-Mobs:  G-I, II, III, IV (PA's, Inf., Post.)  [] (77407) Provided manual therapy to mobilize LE, proximal hip and/or LS spine soft tissue/joints for the purpose of modulating pain, promoting relaxation,  increasing ROM, reducing/eliminating soft tissue swelling/inflammation/restriction, improving soft tissue extensibility and allowing for proper ROM for normal function with self care, mobility, lifting and ambulation.      Modalities:   Vaso x 15 min    Charges:  Timed Code Treatment Minutes: 45   Total Treatment Minutes: 60       [] EVAL (LOW) 02814 (typically 20 minutes face-to-face)  [] EVAL (MOD) 14544 (typically 30 minutes face-to-face)  [] EVAL (HIGH) 51506 (typically 45 minutes face-to-face)  [] RE-EVAL   [x] MO(29428) x  2   [] IONTO  [] NMR (53019) x [x] VASO  [] Manual (81728) x      [] Other:  [x] TA x   1   [] Mech Traction (26211)  [] ES(attended) (73586)      [] ES (un) (36110):       GOALS:   Patient stated goal: strengthen knee. Flexibility. []? Progressing: []? Met: []? Not Met: []? Adjusted    Therapist goals for Patient:   Short Term Goals: To be achieved in: 2 weeks  1. Independent in HEP and progression per patient tolerance, in order to prevent re-injury. []? Progressing: []? Met: []? Not Met: []? Adjusted   2. Patient will have a decrease in pain to facilitate improvement in movement, function, and ADLs as indicated by Functional Deficits. []? Progressing: []? Met: []? Not Met: []? Adjusted     Long Term Goals: To be achieved in: 12 weeks  1. Disability index score of 30% or less for the LEFS to assist with reaching prior level of function. []? Progressing: []? Met: []? Not Met: []? Adjusted  2. Patient will demonstrate increased AROM to [facility] to allow for proper joint functioning as indicated by patients Functional Deficits. []? Progressing: []? Met: []? Not Met: []? Adjusted  3. Patient will demonstrate an increase in Strength to good proximal hip strength and control, within 5lb HHD in LE to allow for proper functional mobility as indicated by patients Functional Deficits. []? Progressing: []? Met: []? Not Met: []? Adjusted  4. Patient will return to ADL and household functional activities without increased symptoms or restriction. []? Progressing: []? Met: []? Not Met: []? Adjusted  5. Pt will return to work and recreational activities. []? Progressing: []? Met: []? Not Met: []? Adjusted       Overall Progression Towards Functional goals/ Treatment Progress Update:  [] Patient is progressing as expected towards functional goals listed. [] Progression is slowed due to complexities/Impairments listed. [] Progression has been slowed due to co-morbidities.   [x] Plan just implemented, too soon to assess goals progression <30days   [] Goals require adjustment due to lack of progress  [] Patient is not progressing as expected and requires additional follow up with physician  [] Other    Prognosis for POC: [x] Good [] Fair  [] Poor      Patient requires continued skilled intervention: [x] Yes  [] No    Treatment/Activity Tolerance:  [x] Patient able to complete treatment  [x] Patient limited by fatigue  [] Patient limited by pain     [] Patient limited by other medical complications  [x] Other:  Pt has improved knee flexion ROM, and he is progressing well with the exercises. Decreased quad strength with flexion leg raises. Mild swelling noted and a slight knee extension deficit. PLAN: Stretching, strengthening, balance program. PF protection. [x] Continue per plan of care [] Alter current plan (see comments above)  [] Plan of care initiated [] Hold pending MD visit [] Discharge      Electronically signed by:  Son Berry PT    Note: If patient does not return for scheduled/ recommended follow up visits, this note will serve as a discharge from care along with most recent update on progress.

## 2021-09-09 ENCOUNTER — HOSPITAL ENCOUNTER (OUTPATIENT)
Dept: PHYSICAL THERAPY | Age: 51
Setting detail: THERAPIES SERIES
Discharge: HOME | End: 2021-09-09
Payer: COMMERCIAL

## 2021-09-09 PROCEDURE — 97110 THERAPEUTIC EXERCISES: CPT | Performed by: PHYSICAL THERAPIST

## 2021-09-09 PROCEDURE — 97530 THERAPEUTIC ACTIVITIES: CPT | Performed by: PHYSICAL THERAPIST

## 2021-09-09 NOTE — FLOWSHEET NOTE
[facility]    Physical Therapy Daily Treatment Note  Date:  2021    Patient Name:  José Manuel Adam    :  1970  MRN: [id number]  Restrictions/Precautions:    Medical/Treatment Diagnosis Information:  · Diagnosis: s/p Right knee partial knee replacement    · DOS: 21          ICD10: M17.11  · Treatment Diagnosis: increased swelling; increased pain; decreased strength; gait abnormality  Insurance/Certification information:  PT Insurance Information: [facility]  Physician Information:  Referring Practitioner: Leeanna Caldwell MD  Has the plan of care been signed (Y/N):        []  Yes  [x]  No     Date of Patient follow up with Physician:       Is this a Progress Report:     []  Yes  [x]  No        If Yes:  Date Range for reporting period:  Beginning 21  Ending     Progress report will be due (10 Rx or 30 days whichever is less): 35      Recertification will be due (POC Duration  / 90 days whichever is less): 21        Visit # Insurance Allowable Auth Required   6(15 total) 20 []  Yes []  No        Functional Scale: LEFS= 28%   Date assessed: 21      Latex Allergy:  [x]NO      []YES  Preferred Language for Healthcare:   [x]English       []other:     Pain level:     SUBJECTIVE:  4 weeks post-op. Pt states he is doing well.      OBJECTIVE:    Observation: Quad/VMO= fair/fair-   Significant HS tightness   Mild swelling   Fair patellar mobility   Test measurements:    Knee extension=  -3 degrees   Knee flexion= 135 degrees sheet pull    RESTRICTIONS/PRECAUTIONS:  Right knee partial knee replacement    Exercises/Interventions:  Right knee  Exercise/Equipment Resistance/Repetitions Other comments   Stretching     Hamstring [id number]    Towel Pull     Inclined Calf [id number]    Hip Flexion     ITB     Groin     Quad                    SLR     Supine Abduction Adduction Prone SLR+          Isometrics     Quad sets          Patellar Glides Medial     Superior     Inferior          ROM     Sheet Pulls     Hang Weights     Passive     Active     Weight Shift     Ankle Pumps     ERMI               CKC     Calf raises 3x15    Wall sits [id number]    Step ups     1 leg stand     Squatting     CC TKE  PF pain   Balance PS L8 x 3 min    bridges          PRE     Extension  RANGE:   Flexion  RANGE:        Quantum machines     Leg press      Leg extension 10# 3x10 90-30 degrees   Leg curl 30# 3x10  0-90 degrees        Manual interventions                 Patient Education:   Access Code: [email or web address]  URL: [email or web address]  Date: 09/01/2021  Prepared by: Gerard Willis    Exercises  Long Sitting Quad Set - 2 x daily - 7 x weekly - 1 sets - 10 reps - 10 hold  Long Sitting Calf Stretch with Strap - 2 x daily - 7 x weekly - 5 reps - 30 sec hold  Seated Table Hamstring Stretch - 2 x daily - 7 x weekly - 5 reps - 30 sec hold  Supine Straight Leg Raises - 2 x daily - 7 x weekly - 3 sets - 10 reps  Side Leg Lifts - 2 x daily - 7 x weekly - 3 sets - 10 reps  Supine Hip Adduction Isometric with Ball - 2 x daily - 7 x weekly - 1 sets - 10 reps - 10 sec hold  Standing Heel Raise - 2 x daily - 7 x weekly - 3 sets - 10 reps  Wall Quarter Squat - 2 x daily - 7 x weekly - 3 reps - 30 sec hold  Standing Knee Flexion AROM with Chair Support - 2 x daily - 7 x weekly - 3 sets - 10 reps  Prone Hip Extension - 2 x daily - 7 x weekly - 3 sets - 10 reps      Therapeutic Exercise and NMR EXR  [x] (58484) Provided verbal/tactile cueing for activities related to strengthening, flexibility, endurance, ROM for improvements in LE, proximal hip, and core control with self care, mobility, lifting, ambulation.  [] (71118) Provided verbal/tactile cueing for activities related to improving balance, coordination, kinesthetic sense, posture, motor skill, proprioception  to assist with LE, proximal hip, and core control in self care, mobility, lifting, ambulation and eccentric single leg control. NMR and Therapeutic Activities:    [x] (23856 or 20515) Provided verbal/tactile cueing for activities related to improving balance, coordination, kinesthetic sense, posture, motor skill, proprioception and motor activation to allow for proper function of core, proximal hip and LE with self care and ADLs  [] (20503) Gait Re-education- Provided training and instruction to the patient for proper LE, core and proximal hip recruitment and positioning and eccentric body weight control with ambulation re-education including up and down stairs     Home Exercise Program:    [x] (34459) Reviewed/Progressed HEP activities related to strengthening, flexibility, endurance, ROM of core, proximal hip and LE for functional self-care, mobility, lifting and ambulation/stair navigation   [x] (03366)Reviewed/Progressed HEP activities related to improving balance, coordination, kinesthetic sense, posture, motor skill, proprioception of core, proximal hip and LE for self care, mobility, lifting, and ambulation/stair navigation      Manual Treatments:  PROM / STM / Oscillations-Mobs:  G-I, II, III, IV (PA's, Inf., Post.)  [] (08772) Provided manual therapy to mobilize LE, proximal hip and/or LS spine soft tissue/joints for the purpose of modulating pain, promoting relaxation,  increasing ROM, reducing/eliminating soft tissue swelling/inflammation/restriction, improving soft tissue extensibility and allowing for proper ROM for normal function with self care, mobility, lifting and ambulation.      Modalities:   ice x 15 min    Charges:  Timed Code Treatment Minutes: 35   Total Treatment Minutes: 50       [] EVAL (LOW) 11848 (typically 20 minutes face-to-face)  [] EVAL (MOD) 01908 (typically 30 minutes face-to-face)  [] EVAL (HIGH) 30675 (typically 45 minutes face-to-face)  [] RE-EVAL   [x] WK(13920) x  1   [] IONTO  [] NMR (09487) x     [] VASO  [] Manual (86999) x      [] Other:  [x] TA x   1   [] Mech Traction (47931)  [] ES(attended) (98763)      [] ES (un) (77578):       GOALS:   Patient stated goal: strengthen knee. Flexibility. []? Progressing: []? Met: []? Not Met: []? Adjusted    Therapist goals for Patient:   Short Term Goals: To be achieved in: 2 weeks  1. Independent in HEP and progression per patient tolerance, in order to prevent re-injury. []? Progressing: []? Met: []? Not Met: []? Adjusted   2. Patient will have a decrease in pain to facilitate improvement in movement, function, and ADLs as indicated by Functional Deficits. []? Progressing: []? Met: []? Not Met: []? Adjusted     Long Term Goals: To be achieved in: 12 weeks  1. Disability index score of 30% or less for the LEFS to assist with reaching prior level of function. []? Progressing: []? Met: []? Not Met: []? Adjusted  2. Patient will demonstrate increased AROM to [facility] to allow for proper joint functioning as indicated by patients Functional Deficits. []? Progressing: []? Met: []? Not Met: []? Adjusted  3. Patient will demonstrate an increase in Strength to good proximal hip strength and control, within 5lb HHD in LE to allow for proper functional mobility as indicated by patients Functional Deficits. []? Progressing: []? Met: []? Not Met: []? Adjusted  4. Patient will return to ADL and household functional activities without increased symptoms or restriction. []? Progressing: []? Met: []? Not Met: []? Adjusted  5. Pt will return to work and recreational activities. []? Progressing: []? Met: []? Not Met: []? Adjusted       Overall Progression Towards Functional goals/ Treatment Progress Update:  [] Patient is progressing as expected towards functional goals listed. [] Progression is slowed due to complexities/Impairments listed. [] Progression has been slowed due to co-morbidities.   [x] Plan just implemented, too soon to assess goals progression <30days   [] Goals require adjustment due to lack of progress  [] Patient is not progressing as expected and requires additional follow up with physician  [] Other    Prognosis for POC: [x] Good [] Fair  [] Poor      Patient requires continued skilled intervention: [x] Yes  [] No    Treatment/Activity Tolerance:  [x] Patient able to complete treatment  [x] Patient limited by fatigue  [] Patient limited by pain     [] Patient limited by other medical complications  [x] Other:  Pt is progressing very well. He has no c/o's pain. He is fatigued at the end of the session. Pt tolerated the balance exercise well. Continue to focus on LE strengthening. PLAN: Stretching, strengthening, balance program. PF protection. [x] Continue per plan of care [] Alter current plan (see comments above)  [] Plan of care initiated [] Hold pending MD visit [] Discharge      Electronically signed by:  Marifer Pan PT    Note: If patient does not return for scheduled/ recommended follow up visits, this note will serve as a discharge from care along with most recent update on progress.

## 2021-09-13 ENCOUNTER — HOSPITAL ENCOUNTER (OUTPATIENT)
Dept: PHYSICAL THERAPY | Age: 51
Setting detail: THERAPIES SERIES
Discharge: HOME | End: 2021-09-13
Payer: COMMERCIAL

## 2021-09-13 PROCEDURE — 97112 NEUROMUSCULAR REEDUCATION: CPT | Performed by: PHYSICAL THERAPIST

## 2021-09-13 PROCEDURE — 97530 THERAPEUTIC ACTIVITIES: CPT | Performed by: PHYSICAL THERAPIST

## 2021-09-13 PROCEDURE — 97110 THERAPEUTIC EXERCISES: CPT | Performed by: PHYSICAL THERAPIST

## 2021-09-13 NOTE — FLOWSHEET NOTE
[facility]    Physical Therapy Daily Treatment Note  Date:  2021    Patient Name:  Patricio Gatica    :  1970  MRN: [id number]  Restrictions/Precautions:    Medical/Treatment Diagnosis Information:  · Diagnosis: s/p Right knee partial knee replacement    · DOS: 21          ICD10: M17.11  · Treatment Diagnosis: increased swelling; increased pain; decreased strength; gait abnormality  Insurance/Certification information:  PT Insurance Information: [facility]  Physician Information:  Referring Practitioner: Odalis Aggarwal MD  Has the plan of care been signed (Y/N):        []  Yes  [x]  No     Date of Patient follow up with Physician:       Is this a Progress Report:     []  Yes  [x]  No        If Yes:  Date Range for reporting period:  Beginning 21  Ending     Progress report will be due (10 Rx or 30 days whichever is less): 13      Recertification will be due (POC Duration  / 90 days whichever is less): 21        Visit # Insurance Allowable Auth Required   7(16 total) 20 []  Yes []  No        Functional Scale: LEFS= 28%   Date assessed: 21      Latex Allergy:  [x]NO      []YES  Preferred Language for Healthcare:   [x]English       []other:     Pain level:     SUBJECTIVE:  ~5 weeks post-op. Pt states he is doing well. No new c/o's.      OBJECTIVE:    Observation: Quad/VMO= fair/fair-   Significant HS tightness   Mild swelling   Fair patellar mobility   Test measurements:    Knee extension=  -3 degrees   Knee flexion= 135 degrees sheet pull/supine 130 degrees AROM     RESTRICTIONS/PRECAUTIONS:  Right knee partial knee replacement    Exercises/Interventions:  Right knee  Exercise/Equipment Resistance/Repetitions Other comments   Stretching     Hamstring [id number]    Towel Pull     Inclined Calf [id number]    Hip Flexion     ITB     Groin     Quad                    SLR     Supine Abduction Adduction Prone SLR+          Isometrics Quad sets          Patellar Glides     Medial     Superior     Inferior          ROM     Sheet Pulls     Hang Weights     Passive     Active     Weight Shift     Ankle Pumps     ERMI     Recumbent bike Seat 12       2.5 x 7 min         CKC     Calf raises 3x15    Wall sits 3x1'    Step ups     1 leg stand [id number]    Squatting     CC TKE  PF pain   Balance     bridges          PRE     Extension  RANGE:   Flexion  RANGE:        Quantum machines     Leg press  6 weeks    Leg extension 15# 3x10 90-30 degrees   Leg curl 30# 3x10  0-90 degrees        Manual interventions                 Patient Education:   Access Code: [email or web address]  URL: [email or web address]  Date: 09/01/2021  Prepared by: Cat Marques    Exercises  Long Sitting Quad Set - 2 x daily - 7 x weekly - 1 sets - 10 reps - 10 hold  Long Sitting Calf Stretch with Strap - 2 x daily - 7 x weekly - 5 reps - 30 sec hold  Seated Table Hamstring Stretch - 2 x daily - 7 x weekly - 5 reps - 30 sec hold  Supine Straight Leg Raises - 2 x daily - 7 x weekly - 3 sets - 10 reps  Side Leg Lifts - 2 x daily - 7 x weekly - 3 sets - 10 reps  Supine Hip Adduction Isometric with Ball - 2 x daily - 7 x weekly - 1 sets - 10 reps - 10 sec hold  Standing Heel Raise - 2 x daily - 7 x weekly - 3 sets - 10 reps  Wall Quarter Squat - 2 x daily - 7 x weekly - 3 reps - 30 sec hold  Standing Knee Flexion AROM with Chair Support - 2 x daily - 7 x weekly - 3 sets - 10 reps  Prone Hip Extension - 2 x daily - 7 x weekly - 3 sets - 10 reps      Therapeutic Exercise and NMR EXR  [x] (19631) Provided verbal/tactile cueing for activities related to strengthening, flexibility, endurance, ROM for improvements in LE, proximal hip, and core control with self care, mobility, lifting, ambulation.  [] (12144) Provided verbal/tactile cueing for activities related to improving balance, coordination, kinesthetic sense, posture, motor skill, proprioception  to assist with LE, proximal hip, and core control in self care, mobility, lifting, ambulation and eccentric single leg control. NMR and Therapeutic Activities:    [x] (53552 or 31350) Provided verbal/tactile cueing for activities related to improving balance, coordination, kinesthetic sense, posture, motor skill, proprioception and motor activation to allow for proper function of core, proximal hip and LE with self care and ADLs  [] (95755) Gait Re-education- Provided training and instruction to the patient for proper LE, core and proximal hip recruitment and positioning and eccentric body weight control with ambulation re-education including up and down stairs     Home Exercise Program:    [x] (39035) Reviewed/Progressed HEP activities related to strengthening, flexibility, endurance, ROM of core, proximal hip and LE for functional self-care, mobility, lifting and ambulation/stair navigation   [x] (69111)Reviewed/Progressed HEP activities related to improving balance, coordination, kinesthetic sense, posture, motor skill, proprioception of core, proximal hip and LE for self care, mobility, lifting, and ambulation/stair navigation      Manual Treatments:  PROM / STM / Oscillations-Mobs:  G-I, II, III, IV (PA's, Inf., Post.)  [] (01935) Provided manual therapy to mobilize LE, proximal hip and/or LS spine soft tissue/joints for the purpose of modulating pain, promoting relaxation,  increasing ROM, reducing/eliminating soft tissue swelling/inflammation/restriction, improving soft tissue extensibility and allowing for proper ROM for normal function with self care, mobility, lifting and ambulation.      Modalities:       Charges:  Timed Code Treatment Minutes: 40   Total Treatment Minutes: 40       [] EVAL (LOW) 60646 (typically 20 minutes face-to-face)  [] EVAL (MOD) 54405 (typically 30 minutes face-to-face)  [] EVAL (HIGH) 33049 (typically 45 minutes face-to-face)  [] RE-EVAL   [x] SF(91318) x  1   [] IONTO  [x] NMR (36993) x  1   [] VASO  [] Manual ([id number]) x      [] Other:  [x] TA x   1   [] [facility] Traction (74444)  [] ES(attended) (84645)      [] ES (un) (89306):       GOALS:   Patient stated goal: strengthen knee. Flexibility. []? Progressing: []? Met: []? Not Met: []? Adjusted    Therapist goals for Patient:   Short Term Goals: To be achieved in: 2 weeks  1. Independent in HEP and progression per patient tolerance, in order to prevent re-injury. []? Progressing: []? Met: []? Not Met: []? Adjusted   2. Patient will have a decrease in pain to facilitate improvement in movement, function, and ADLs as indicated by Functional Deficits. []? Progressing: []? Met: []? Not Met: []? Adjusted     Long Term Goals: To be achieved in: 12 weeks  1. Disability index score of 30% or less for the LEFS to assist with reaching prior level of function. []? Progressing: []? Met: []? Not Met: []? Adjusted  2. Patient will demonstrate [facility] to allow for proper joint functioning as indicated by patients Functional Deficits. []? Progressing: []? Met: []? Not Met: []? Adjusted  3. Patient will demonstrate an increase in Strength to good proximal hip strength and control, within 5lb HHD in LE to allow for proper functional mobility as indicated by patients Functional Deficits. []? Progressing: []? Met: []? Not Met: []? Adjusted  4. Patient will return to ADL and household functional activities without increased symptoms or restriction. []? Progressing: []? Met: []? Not Met: []? Adjusted  5. Pt will return to work and recreational activities. []? Progressing: []? Met: []? Not Met: []? Adjusted       Overall Progression Towards Functional goals/ Treatment Progress Update:  [] Patient is progressing as expected towards functional goals listed. [] Progression is slowed due to complexities/Impairments listed. [] Progression has been slowed due to co-morbidities.   [x] Plan just implemented, too soon to assess goals progression <30days   [] Goals require adjustment due to lack of progress  [] Patient is not progressing as expected and requires additional follow up with physician  [] Other    Prognosis for POC: [x] Good [] Fair  [] Poor      Patient requires continued skilled intervention: [x] Yes  [] No    Treatment/Activity Tolerance:  [x] Patient able to complete treatment  [x] Patient limited by fatigue  [] Patient limited by pain     [] Patient limited by other medical complications  [x] Other:  Pt is doing very well. He must continue to work on quad strengthening and endurance. Advanced balance exercise. Initiated bike today. We discussed future plans for RTW and return to the gym. PLAN: Stretching, strengthening, balance program. PF protection. [x] Continue per plan of care [] Alter current plan (see comments above)  [] Plan of care initiated [] Hold pending MD visit [] Discharge      Electronically signed by:  Edelmira Ramsey PT    Note: If patient does not return for scheduled/ recommended follow up visits, this note will serve as a discharge from care along with most recent update on progress.

## 2021-09-16 ENCOUNTER — HOSPITAL ENCOUNTER (OUTPATIENT)
Dept: PHYSICAL THERAPY | Age: 51
Setting detail: THERAPIES SERIES
Discharge: HOME | End: 2021-09-16
Payer: COMMERCIAL

## 2021-09-16 NOTE — FLOWSHEET NOTE
[facility] [address] [facility] [place] [address]    Physical Therapy  Cancellation/No-show Note  Patient Name:  Carmen Ferreira  :  1970   Date:  2021  Cancelled visits to date: 1  No-shows to date: 0    For today's appointment patient:  [x]  Cancelled  []  Rescheduled appointment  []  No-show     Reason given by patient:  []  Patient ill  []  Conflicting appointment   []  No transportation    [x]  Conflict with work  []  No reason given  []  Other:     Comments:      Electronically signed by:  Monica Collins PT, PT

## 2021-09-20 ENCOUNTER — HOSPITAL ENCOUNTER (OUTPATIENT)
Dept: PHYSICAL THERAPY | Age: 51
Setting detail: THERAPIES SERIES
Discharge: HOME | End: 2021-09-20
Payer: COMMERCIAL

## 2021-09-20 PROCEDURE — 97112 NEUROMUSCULAR REEDUCATION: CPT | Performed by: PHYSICAL THERAPIST

## 2021-09-20 PROCEDURE — 97110 THERAPEUTIC EXERCISES: CPT | Performed by: PHYSICAL THERAPIST

## 2021-09-20 PROCEDURE — 97530 THERAPEUTIC ACTIVITIES: CPT | Performed by: PHYSICAL THERAPIST

## 2021-09-20 NOTE — PROGRESS NOTES
[address] [facility]    Physical Therapy Daily Treatment Note  Date:  2021    Patient Name:  José Manuel Adam    :  1970  MRN: [id number]  Restrictions/Precautions:    Medical/Treatment Diagnosis Information:  · Diagnosis: s/p Right knee partial knee replacement    · DOS: 21          ICD10: M17.11  · Treatment Diagnosis: increased swelling; increased pain; decreased strength; gait abnormality  Insurance/Certification information:  PT Insurance Information: [facility]  Physician Information:  Referring Practitioner: Leeanna Caldwell MD  Has the plan of care been signed (Y/N):        []  Yes  [x]  No     Date of Patient follow up with Physician:       Is this a Progress Report:     [x]  Yes  []  No        If Yes:  Date Range for reporting period:  Beginning 21  Ending 21    Progress report will be due (10 Rx or 30 days whichever is less):       Recertification will be due (POC Duration  / 90 days whichever is less): 21        Visit # Insurance Allowable Auth Required   8(17 total) 20 []  Yes []  No        Functional Scale: LEFS= 28%   Date assessed: 21      Latex Allergy:  [x]NO      []YES  Preferred Language for Healthcare:   [x]English       []other:     Pain level:     SUBJECTIVE:  6 weeks post-op. Pt is doing well. He has occasional pain in the knee, but it resolves quickly.      OBJECTIVE:    Observation: Quad/VMO= fair/fair-   Significant HS tightness   Mild swelling   Fair patellar mobility   Test measurements:    Knee extension=  -3 degrees   Knee flexion= 135 degrees sheet pull/supine 130 degrees AROM     RESTRICTIONS/PRECAUTIONS:  Right knee partial knee replacement    Exercises/Interventions:  Right knee  Exercise/Equipment Resistance/Repetitions Other comments   Stretching     Hamstring [id number]    Towel Pull     Inclined Calf [id number]    Hip Flexion     ITB     Groin     Quad                    SLR     Supine Abduction Adduction Prone SLR+          Isometrics     Quad sets          Patellar Glides     Medial     Superior     Inferior          ROM     Sheet Pulls     Hang Weights     Passive     Active     Weight Shift     Ankle Pumps     ERMI     Recumbent bike Seat 12       2.5 x 7 min         CKC     Calf raises     Wall sits 2x1'    Step ups     1 leg stand [id number]    Squatting     CC TKE  PF pain   Balance     bridges Next visit    Lateral band walking Next visit    PRE     Extension  RANGE:   Flexion  RANGE:   Sit to stand Chair with mat 1x10    Quantum machines     Leg press  80# 3x10 90-10 degrees   Leg extension 15# 3x10 90-30 degrees   Leg curl 35# 3x10  0-90 degrees        Manual interventions                 Patient Education:   Access Code: [email or web address]  URL: [email or web address]  Date: 09/01/2021  Prepared by: Bell Slider    Exercises  Long Sitting Quad Set - 2 x daily - 7 x weekly - 1 sets - 10 reps - 10 hold  Long Sitting Calf Stretch with Strap - 2 x daily - 7 x weekly - 5 reps - 30 sec hold  Seated Table Hamstring Stretch - 2 x daily - 7 x weekly - 5 reps - 30 sec hold  Supine Straight Leg Raises - 2 x daily - 7 x weekly - 3 sets - 10 reps  Side Leg Lifts - 2 x daily - 7 x weekly - 3 sets - 10 reps  Supine Hip Adduction Isometric with Ball - 2 x daily - 7 x weekly - 1 sets - 10 reps - 10 sec hold  Standing Heel Raise - 2 x daily - 7 x weekly - 3 sets - 10 reps  Wall Quarter Squat - 2 x daily - 7 x weekly - 3 reps - 30 sec hold  Standing Knee Flexion AROM with Chair Support - 2 x daily - 7 x weekly - 3 sets - 10 reps  Prone Hip Extension - 2 x daily - 7 x weekly - 3 sets - 10 reps      Therapeutic Exercise and NMR EXR  [x] (19174) Provided verbal/tactile cueing for activities related to strengthening, flexibility, endurance, ROM for improvements in LE, proximal hip, and core control with self care, mobility, lifting, ambulation.  [] (93312) Provided verbal/tactile cueing for activities related to improving balance, coordination, kinesthetic sense, posture, motor skill, proprioception  to assist with LE, proximal hip, and core control in self care, mobility, lifting, ambulation and eccentric single leg control. NMR and Therapeutic Activities:    [x] (43780 or 18126) Provided verbal/tactile cueing for activities related to improving balance, coordination, kinesthetic sense, posture, motor skill, proprioception and motor activation to allow for proper function of core, proximal hip and LE with self care and ADLs  [] (43519) Gait Re-education- Provided training and instruction to the patient for proper LE, core and proximal hip recruitment and positioning and eccentric body weight control with ambulation re-education including up and down stairs     Home Exercise Program:    [x] (07414) Reviewed/Progressed HEP activities related to strengthening, flexibility, endurance, ROM of core, proximal hip and LE for functional self-care, mobility, lifting and ambulation/stair navigation   [x] (39366)Reviewed/Progressed HEP activities related to improving balance, coordination, kinesthetic sense, posture, motor skill, proprioception of core, proximal hip and LE for self care, mobility, lifting, and ambulation/stair navigation      Manual Treatments:  PROM / STM / Oscillations-Mobs:  G-I, II, III, IV (PA's, Inf., Post.)  [] (92947) Provided manual therapy to mobilize LE, proximal hip and/or LS spine soft tissue/joints for the purpose of modulating pain, promoting relaxation,  increasing ROM, reducing/eliminating soft tissue swelling/inflammation/restriction, improving soft tissue extensibility and allowing for proper ROM for normal function with self care, mobility, lifting and ambulation.      Modalities:       Charges:  Timed Code Treatment Minutes: 40   Total Treatment Minutes: 40       [] EVAL (LOW) 88454 (typically 20 minutes face-to-face)  [] EVAL (MOD) 16828 (typically 30 minutes face-to-face)  [] EVAL (HIGH) 11716 (typically 45 minutes face-to-face)  [] RE-EVAL   [x] BS(06228) x  1   [] IONTO  [x] NMR (29415) x  1   [] VASO  [] Manual (46924) x      [] Other:  [x] TA x   1   [] Mech Traction (23158)  [] ES(attended) (89729)      [] ES (un) (81316):       GOALS:   Patient stated goal: strengthen knee. Flexibility. []? Progressing: []? Met: []? Not Met: []? Adjusted    Therapist goals for Patient:   Short Term Goals: To be achieved in: 2 weeks  1. Independent in HEP and progression per patient tolerance, in order to prevent re-injury. []? Progressing: []? Met: []? Not Met: []? Adjusted   2. Patient will have a decrease in pain to facilitate improvement in movement, function, and ADLs as indicated by Functional Deficits. []? Progressing: []? Met: []? Not Met: []? Adjusted     Long Term Goals: To be achieved in: 12 weeks  1. Disability index score of 30% or less for the LEFS to assist with reaching prior level of function. []? Progressing: []? Met: []? Not Met: []? Adjusted  2. Patient will demonstrate increased AROM to [facility] to allow for proper joint functioning as indicated by patients Functional Deficits. []? Progressing: []? Met: []? Not Met: []? Adjusted  3. Patient will demonstrate an increase in Strength to good proximal hip strength and control, within 5lb HHD in LE to allow for proper functional mobility as indicated by patients Functional Deficits. []? Progressing: []? Met: []? Not Met: []? Adjusted  4. Patient will return to ADL and household functional activities without increased symptoms or restriction. []? Progressing: []? Met: []? Not Met: []? Adjusted  5. Pt will return to work and recreational activities. []? Progressing: []? Met: []? Not Met: []? Adjusted       Overall Progression Towards Functional goals/ Treatment Progress Update:  [] Patient is progressing as expected towards functional goals listed. [] Progression is slowed due to complexities/Impairments listed.   [] Progression has been slowed due to co-morbidities. [x] Plan just implemented, too soon to assess goals progression <30days   [] Goals require adjustment due to lack of progress  [] Patient is not progressing as expected and requires additional follow up with physician  [] Other    Prognosis for POC: [x] Good [] Fair  [] Poor      Patient requires continued skilled intervention: [x] Yes  [] No    Treatment/Activity Tolerance:  [x] Patient able to complete treatment  [x] Patient limited by fatigue  [] Patient limited by pain     [] Patient limited by other medical complications  [x] Other: Pt has decreased quad strength and endurance. He fatigues easily. Balance exercise is challenging when the quad is fatigued. Pt had mild difficulty with sit to stand exercise. PLAN: Stretching, strengthening, balance program. PF protection. [x] Continue per plan of care [] Alter current plan (see comments above)  [] Plan of care initiated [] Hold pending MD visit [] Discharge      Electronically signed by:  Bell Stapleton, PT    Note: If patient does not return for scheduled/ recommended follow up visits, this note will serve as a discharge from care along with most recent update on progress.

## 2021-09-22 ENCOUNTER — HOSPITAL ENCOUNTER (OUTPATIENT)
Dept: PHYSICAL THERAPY | Age: 51
Setting detail: THERAPIES SERIES
Discharge: HOME | End: 2021-09-22
Payer: COMMERCIAL

## 2021-09-22 NOTE — FLOWSHEET NOTE
[facility] [address] [facility] [address]    Physical Therapy  Cancellation/No-show Note  Patient Name:  Shan Garcia  :  1970   Date:  2021  Cancelled visits to date: 2  No-shows to date: 0    For today's appointment patient:  [x]  Cancelled  []  Rescheduled appointment  []  No-show     Reason given by patient:  []  Patient ill  []  Conflicting appointment   []  No transportation    [x]  Conflict with work  []  No reason given  []  Other:     Comments:      Electronically signed by:  Jill Waters PT

## 2021-09-29 ENCOUNTER — HOSPITAL ENCOUNTER (OUTPATIENT)
Dept: PHYSICAL THERAPY | Age: 51
Setting detail: THERAPIES SERIES
Discharge: HOME | End: 2021-09-29
Payer: COMMERCIAL

## 2021-09-29 PROCEDURE — 97112 NEUROMUSCULAR REEDUCATION: CPT | Performed by: PHYSICAL THERAPIST

## 2021-09-29 PROCEDURE — 97110 THERAPEUTIC EXERCISES: CPT | Performed by: PHYSICAL THERAPIST

## 2021-09-29 PROCEDURE — 97530 THERAPEUTIC ACTIVITIES: CPT | Performed by: PHYSICAL THERAPIST

## 2021-10-12 ENCOUNTER — TELEPHONE (OUTPATIENT)
Dept: ORTHOPEDIC SURGERY | Age: 51
End: 2021-10-12

## 2021-10-12 NOTE — TELEPHONE ENCOUNTER
LVM for patient regarding the [address] [place] [address] Orthopedic joint pain program. Patient can call [phone] for more information or to schedule an appointment with a joint pain specialist.

## 2021-10-15 ENCOUNTER — CLINICAL DOCUMENTATION (OUTPATIENT)
Dept: OTHER | Age: 51
End: 2021-10-15

## 2022-05-12 ENCOUNTER — TELEPHONE (OUTPATIENT)
Dept: DERMATOLOGY | Age: 52
End: 2022-05-12

## 2022-05-12 NOTE — TELEPHONE ENCOUNTER
[phone] Patient's wife Denae Leija is requesting a return call to schedule an appt for spot on forehead that Dr Abi Hidalgo has treated in the past. Please advise. Thank you!

## 2022-07-08 ENCOUNTER — PROCEDURE VISIT (OUTPATIENT)
Dept: DERMATOLOGY | Age: 52
End: 2022-07-08
Payer: COMMERCIAL

## 2022-07-08 DIAGNOSIS — D23.9 APOCRINE HIDROCYSTOMA: Primary | ICD-10-CM

## 2022-07-08 PROCEDURE — 11440 EXC FACE-MM B9+MARG 0.5 CM/<: CPT | Performed by: DERMATOLOGY

## 2022-07-08 NOTE — PROGRESS NOTES
[facility]ermat[facility]  Victor Manuel Junior MD  [phone]      Ester Perez  1970    54 y.o. male     Date of Visit: 7/8/2022    Chief Complaint: cyst    History of Present Illness:    He returns today for recurrent lesion on the central forehead. Prior excision revealed an apocrine hydrocystoma. Review of Systems:  Gen: Feels well, good sense of health. Past Medical History, Family History, Surgical History, Medications and Allergies reviewed. Past Medical History:   Diagnosis Date    Hyperlipidemia      Past Surgical History:   Procedure Laterality Date    BACK SURGERY  2015    spinal fusion       No Known Allergies  Outpatient Medications Marked as Taking for the 7/8/22 encounter (Procedure visit) with Sara Michaels MD   Medication Sig Dispense Refill    atorvastatin (LIPITOR) 10 MG tablet Take 10 mg by mouth nightly.  ALPRAZolam (XANAX) 1 MG tablet Take 1 mg by mouth 3 times daily as needed for Sleep.  omega-3 acid ethyl esters (LOVAZA) 1 G capsule Take 2 g by mouth 2 times daily.  vitamin D (CHOLECALCIFEROL) 1000 UNIT TABS tablet Take 3,000 Units by mouth daily       esomeprazole Magnesium (NEXIUM) 40 MG PACK Take 40 mg by mouth daily. Physical Examination       Well-appearing. 1.  Central upper forehead - 5 mm round subcutaneous papule. Assessment and Plan     1. 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 The patient was educated regarding potential risks of bleeding, discomfort, scar, and recurrence. A consent form was signed by the patient. The area was prepped and draped in the normal sterile fashion using ChloraPrep. Local anesthesia was obtained wth 1% lidocaine with epinephrine. An incision was performed overlying the lesion, the cyst was visualized and dissected from the surrounding tissue.  The specimen was submitted to pathology in an appropriately labeled specimen container. Pinpoint hemostasis was obtained. The wound edges were approximated a buried suture of 5-0 Vicryl. The surface of the wound was covered with Dermabond. Blood loss was minimal and there were no immediate complications. The wound was covered with a pressure bandage.             --Maximus Berrios MD

## 2022-07-12 LAB — DERMATOLOGY PATHOLOGY REPORT: NORMAL
